# Patient Record
Sex: MALE | Race: BLACK OR AFRICAN AMERICAN | Employment: OTHER | URBAN - METROPOLITAN AREA
[De-identification: names, ages, dates, MRNs, and addresses within clinical notes are randomized per-mention and may not be internally consistent; named-entity substitution may affect disease eponyms.]

---

## 2017-01-13 ENCOUNTER — OFFICE VISIT (OUTPATIENT)
Dept: INTERNAL MEDICINE CLINIC | Age: 82
End: 2017-01-13

## 2017-01-13 VITALS
DIASTOLIC BLOOD PRESSURE: 57 MMHG | RESPIRATION RATE: 18 BRPM | SYSTOLIC BLOOD PRESSURE: 137 MMHG | WEIGHT: 146 LBS | BODY MASS INDEX: 22.91 KG/M2 | OXYGEN SATURATION: 100 % | HEART RATE: 43 BPM | TEMPERATURE: 97.8 F | HEIGHT: 67 IN

## 2017-01-13 DIAGNOSIS — I10 HYPERTENSION, ESSENTIAL, BENIGN: ICD-10-CM

## 2017-01-13 DIAGNOSIS — N40.1 BPH (BENIGN PROSTATIC HYPERTROPHY) WITH URINARY OBSTRUCTION: ICD-10-CM

## 2017-01-13 DIAGNOSIS — R35.0 URINE FREQUENCY: Primary | ICD-10-CM

## 2017-01-13 DIAGNOSIS — I50.22 SYSTOLIC CHF, CHRONIC (HCC): ICD-10-CM

## 2017-01-13 DIAGNOSIS — N13.8 BPH (BENIGN PROSTATIC HYPERTROPHY) WITH URINARY OBSTRUCTION: ICD-10-CM

## 2017-01-13 LAB
BILIRUB UR QL STRIP: NEGATIVE
GLUCOSE POC: 141 MG/DL
GLUCOSE UR-MCNC: NEGATIVE MG/DL
HBA1C MFR BLD HPLC: 5.2 %
KETONES P FAST UR STRIP-MCNC: NEGATIVE MG/DL
PH UR STRIP: 5 [PH] (ref 4.6–8)
PROT UR QL STRIP: NEGATIVE MG/DL
SP GR UR STRIP: 1025 (ref 1–1.03)
UA UROBILINOGEN AMB POC: ABNORMAL (ref 0.2–1)
URINALYSIS CLARITY POC: CLEAR
URINALYSIS COLOR POC: YELLOW
URINE BLOOD POC: NEGATIVE
URINE LEUKOCYTES POC: NEGATIVE
URINE NITRITES POC: NEGATIVE

## 2017-01-13 NOTE — PROGRESS NOTES
Reviewed record in preparation for visit and have obtained necessary documentation. Identified pt with two pt identifiers(name and ). Health Maintenance Due   Topic    DTaP/Tdap/Td series (1 - Tdap)    ZOSTER VACCINE AGE 60>     GLAUCOMA SCREENING Q2Y     MEDICARE YEARLY EXAM          Chief Complaint   Patient presents with    Urinary Frequency     medication not helping          Learning Assessment:  :     Learning Assessment 2/3/2014 2014   PRIMARY LEARNER Patient Patient   HIGHEST LEVEL OF EDUCATION - PRIMARY LEARNER  - GRADUATED HIGH SCHOOL OR GED   BARRIERS PRIMARY LEARNER - NONE   CO-LEARNER CAREGIVER - No   PRIMARY LANGUAGE ENGLISH ENGLISH   LEARNER PREFERENCE PRIMARY READING READING   ANSWERED BY patient patient    RELATIONSHIP SELF SELF       Depression Screening:  :     PHQ 2 / 9, over the last two weeks 2016   Little interest or pleasure in doing things Not at all   Feeling down, depressed or hopeless Not at all   Total Score PHQ 2 0       Fall Risk Assessment:  :     Fall Risk Assessment, last 12 mths 2016   Able to walk? Yes   Fall in past 12 months? No       Abuse Screening:  :     Abuse Screening Questionnaire 2015   Do you ever feel afraid of your partner? N N   Are you in a relationship with someone who physically or mentally threatens you? N N   Is it safe for you to go home? Y Y       Coordination of Care Questionnaire:  :     1) Have you been to an emergency room, urgent care clinic since your last visit? no   Hospitalized since your last visit? no             2) Have you seen or consulted any other health care providers outside of [address] [place] [address] since your last visit? no  (Include any pap smears or colon screenings in this section.)    3) Do you have an Advance Directive on file? yes    4) Are you interested in receiving information on Advance Directives?  NO      Patient is accompanied by patient I have received verbal consent from Ximena Barraza Ruiz to discuss any/all medical information while they are present in the room.

## 2017-01-13 NOTE — MR AVS SNAPSHOT
Visit Information Date & Time Provider Department Dept. Phone Encounter #  
 1/13/2017 10:00 AM Indiana JangAndrew [facility] Internal Medicine [phone] [id number] Follow-up Instructions Return in about 3 months (around 4/13/2017). Your Appointments 3/6/2017 10:45 AM  
ROUTINE CARE with Indiana Jang DO [facility] Internal Medicine ([facility]) Appt Note: 4 month follow up [address] [facility] [address]  
[address]  
  
    
 6/26/2017 10:00 AM  
ESTABLISHED PATIENT with Lila Cox MD  
[facility] ([facility]) Appt Note: June f/u [address] [place]  
[address] [address] Upcoming Health Maintenance Date Due DTaP/Tdap/Td series (1 - Tdap) 11/19/1956 ZOSTER VACCINE AGE 60> 11/19/1995 GLAUCOMA SCREENING Q2Y 11/19/2000 MEDICARE YEARLY EXAM 12/22/2016 Pneumococcal 65+ Low/Medium Risk (2 of 2 - PPSV23) 5/20/2017 Allergies as of 1/13/2017  Review Complete On: 1/13/2017 By: Indiana Jang DO Severity Noted Reaction Type Reactions Iodine High 02/22/2010   Intolerance Nausea and Vomiting Current Immunizations  Reviewed on 11/1/2016 Name Date Influenza High Dose Vaccine PF 9/6/2016, 9/29/2015 Influenza Vaccine 9/29/2015, 11/6/2013 Influenza Vaccine Split 10/12/2011 11:19 AM  
 Pneumococcal Conjugate (PCV-13) 5/20/2016 Not reviewed this visit You Were Diagnosed With   
  
 Codes Comments Urine frequency    -  Primary ICD-10-CM: R35.0 ICD-9-CM: 788.41   
 BPH (benign prostatic hypertrophy) with urinary obstruction     ICD-10-CM: N40.1, N13.8 ICD-9-CM: 600.01, 599.69 Systolic CHF, chronic (HCC)     ICD-10-CM: I50.22 ICD-9-CM: 428.22, 428.0  Hypertension, essential, benign     ICD-10-CM: I10 
 ICD-9-CM: 401.1 Vitals BP Pulse Temp Resp Height(growth percentile) Weight(growth percentile) 137/57 (BP 1 Location: Right arm, BP Patient Position: Sitting) (!) 43 97.8 °F (36.6 °C) (Oral) 18 5' 7\" (1.702 m) 146 lb (66.2 kg) SpO2 BMI Smoking Status 100% 22.87 kg/m2 Never Smoker BMI and BSA Data Body Mass Index Body Surface Area  
 22.87 kg/m 2 1.77 m 2 Preferred Pharmacy Pharmacy Name Phone [address] AT [address] [place] [address]. [phone] Your Updated Medication List  
  
   
This list is accurate as of: 1/13/17 11:17 AM.  Always use your most recent med list.  
  
  
  
  
 aspirin 81 mg chewable tablet Take 1 Tab by mouth daily. atorvastatin 10 mg tablet Commonly known as:  LIPITOR  
TAKE 1 TABLET BY MOUTH EVERY NIGHT AT BEDTIME  
  
 diclofenac 1 % Gel Commonly known as:  VOLTAREN Apply 2 g to affected area four (4) times daily. finasteride 5 mg tablet Commonly known as:  PROSCAR Take 1 Tab by mouth daily. losartan 50 mg tablet Commonly known as:  COZAAR Take 2 Tabs by mouth daily. We Performed the Following AMB POC HEMOGLOBIN A1C [67414 CPT(R)] AMB POC URINALYSIS DIP STICK AUTO W/O MICRO [09363 CPT(R)] AMB POC URINALYSIS DIP STICK AUTO W/O MICRO [86500 CPT(R)] Follow-up Instructions Return in about 3 months (around 4/13/2017). Introducing [facility]! Jame Odell introduces [employer] patient portal. Now you can access parts of your medical record, email your doctor's office, and request medication refills online. 1. In your internet browser, go to [email or web address] 2. Click on the First Time User? Click Here link in the Sign In box. You will see the New Member Sign Up page. 3. Enter your [employer] Access Code exactly as it appears below.  You will not need to use this code after youve completed the sign-up process. If you do not sign up before the expiration date, you must request a new code. · [employer] Access Code: [email or web address] Expires: 1/30/2017 11:14 AM 
 
4. Enter the last four digits of your Social Security Number (xxxx) and Date of Birth (mm/dd/yyyy) as indicated and click Submit. You will be taken to the next sign-up page. 5. Create a [employer] ID. This will be your [employer] login ID and cannot be changed, so think of one that is secure and easy to remember. 6. Create a [employer] password. You can change your password at any time. 7. Enter your Password Reset Question and Answer. This can be used at a later time if you forget your password. 8. Enter your e-mail address. You will receive e-mail notification when new information is available in [address] 9. Click Sign Up. You can now view and download portions of your medical record. 10. Click the Download Summary menu link to download a portable copy of your medical information. If you have questions, please visit the Frequently Asked Questions section of the [employer] website. Remember, [employer] is NOT to be used for urgent needs. For medical emergencies, dial 911. Now available from your iPhone and Android! Please provide this summary of care documentation to your next provider. Your primary care clinician is listed as Florentin Marshall. If you have any questions after today's visit, please call [phone].

## 2017-01-17 ENCOUNTER — OFFICE VISIT (OUTPATIENT)
Dept: INTERNAL MEDICINE CLINIC | Age: 82
End: 2017-01-17

## 2017-01-17 VITALS
HEART RATE: 48 BPM | RESPIRATION RATE: 17 BRPM | BODY MASS INDEX: 23.07 KG/M2 | OXYGEN SATURATION: 100 % | DIASTOLIC BLOOD PRESSURE: 63 MMHG | SYSTOLIC BLOOD PRESSURE: 162 MMHG | WEIGHT: 147 LBS | HEIGHT: 67 IN | TEMPERATURE: 97 F

## 2017-01-17 DIAGNOSIS — R10.9 LEFT SIDED ABDOMINAL PAIN: Primary | ICD-10-CM

## 2017-01-17 DIAGNOSIS — R39.15 URINARY URGENCY: ICD-10-CM

## 2017-01-17 DIAGNOSIS — R35.0 URINE FREQUENCY: ICD-10-CM

## 2017-01-17 DIAGNOSIS — I50.22 SYSTOLIC CHF, CHRONIC (HCC): ICD-10-CM

## 2017-01-17 DIAGNOSIS — I10 HYPERTENSION, ESSENTIAL, BENIGN: ICD-10-CM

## 2017-01-17 LAB
BILIRUB UR QL STRIP: NEGATIVE
GLUCOSE UR-MCNC: NEGATIVE MG/DL
KETONES P FAST UR STRIP-MCNC: NEGATIVE MG/DL
PH UR STRIP: 5.5 [PH] (ref 4.6–8)
PROT UR QL STRIP: NEGATIVE MG/DL
SP GR UR STRIP: 1025 (ref 1–1.03)
UA UROBILINOGEN AMB POC: ABNORMAL (ref 0.2–1)
URINALYSIS CLARITY POC: CLEAR
URINALYSIS COLOR POC: YELLOW
URINE BLOOD POC: NEGATIVE
URINE LEUKOCYTES POC: NEGATIVE
URINE NITRITES POC: NEGATIVE

## 2017-01-17 NOTE — PROGRESS NOTES
Reviewed record in preparation for visit and have obtained necessary documentation. Identified pt with two pt identifiers(name and ). Health Maintenance Due   Topic    DTaP/Tdap/Td series (1 - Tdap)    ZOSTER VACCINE AGE 60>     GLAUCOMA SCREENING Q2Y     MEDICARE YEARLY EXAM          Chief Complaint   Patient presents with    Urinary Frequency          Learning Assessment:  :     Learning Assessment 2/3/2014 2014   PRIMARY LEARNER Patient Patient   HIGHEST LEVEL OF EDUCATION - PRIMARY LEARNER  - GRADUATED HIGH SCHOOL OR GED   BARRIERS PRIMARY LEARNER - NONE   CO-LEARNER CAREGIVER - No   PRIMARY LANGUAGE ENGLISH ENGLISH   LEARNER PREFERENCE PRIMARY READING READING   ANSWERED BY patient patient    RELATIONSHIP SELF SELF       Depression Screening:  :     PHQ 2 / 9, over the last two weeks 2016   Little interest or pleasure in doing things Not at all   Feeling down, depressed or hopeless Not at all   Total Score PHQ 2 0       Fall Risk Assessment:  :     Fall Risk Assessment, last 12 mths 2016   Able to walk? Yes   Fall in past 12 months? No       Abuse Screening:  :     Abuse Screening Questionnaire 2015   Do you ever feel afraid of your partner? N N   Are you in a relationship with someone who physically or mentally threatens you? N N   Is it safe for you to go home? Y Y       Coordination of Care Questionnaire:  :     1) Have you been to an emergency room, urgent care clinic since your last visit? no   Hospitalized since your last visit? no             2) Have you seen or consulted any other health care providers outside of [address] [place] [address] since your last visit? no  (Include any pap smears or colon screenings in this section.)    3) Do you have an Advance Directive on file? yes    4) Are you interested in receiving information on Advance Directives?  NO      Patient is accompanied by patient I have received verbal consent from Maria Fernanda De Souza to discuss any/all medical information while they are present in the room.

## 2017-01-17 NOTE — MR AVS SNAPSHOT
Visit Information Date & Time Provider Department Dept. Phone Encounter #  
 1/17/2017  9:30 AM Tamika Martinez, [address] [facility] Internal Medicine [phone] [id number] Follow-up Instructions Return if symptoms worsen or fail to improve. Your Appointments 3/6/2017 10:45 AM  
ROUTINE CARE with Tamika Martinez DO [facility] Internal Medicine ([address] [place] [address]) Appt Note: 4 month follow up [address] [facility] [address]  
[phone]  
  
   
 [address] [address]  
  
    
 4/14/2017  9:45 AM  
ROUTINE CARE with Tamika Martinez DO [facility] Internal Medicine ([address] [place] [address]) Appt Note: 3 month f/u  
 [address] [facility] [address]  
[phone]  
  
    
 6/26/2017 10:00 AM  
ESTABLISHED PATIENT with Heidy Myers MD  
[facility] ([facility]) Appt Note: June f/u  
 [address] [place] [address]  
[address] [address] [facility] [address] [address] Upcoming Health Maintenance Date Due DTaP/Tdap/Td series (1 - Tdap) [birth year]19/1956 ZOSTER VACCINE AGE 60> 11/19/1995 GLAUCOMA SCREENING Q2Y 11/19/2000 MEDICARE YEARLY EXAM 12/22/2016 Pneumococcal 65+ Low/Medium Risk (2 of 2 - PPSV23) 5/20/2017 Allergies as of 1/17/2017  Review Complete On: 1/17/2017 By: Tamika Martinez DO Severity Noted Reaction Type Reactions Iodine High 02/22/2010   Intolerance Nausea and Vomiting Current Immunizations  Reviewed on 11/1/2016 Name Date Influenza High Dose Vaccine PF 9/6/2016, 9/29/2015 Influenza Vaccine 9/29/2015, 11/6/2013 Influenza Vaccine Split 10/12/2011 11:19 AM  
 Pneumococcal Conjugate (PCV-13) 5/20/2016 Not reviewed this visit You Were Diagnosed With   
  
 Codes Comments Left sided abdominal pain    -  Primary ICD-10-CM: R10.9 ICD-9-CM: 789.09 Urine frequency     ICD-10-CM: R35.0 ICD-9-CM: 788.41 Urinary urgency     ICD-10-CM: R39.15 ICD-9-CM: 399.42 Systolic CHF, chronic (HCC)     ICD-10-CM: I50.22 ICD-9-CM: 428.22, 428.0 Hypertension, essential, benign     ICD-10-CM: I10 
ICD-9-CM: 401.1 Vitals BP Pulse Temp Resp Height(growth percentile) Weight(growth percentile) 162/63 (BP 1 Location: Right arm, BP Patient Position: Sitting) (!) 48 97 °F (36.1 °C) (Oral) 17 5' 7\" (1.702 m) 147 lb (66.7 kg) SpO2 BMI Smoking Status 100% 23.02 kg/m2 Never Smoker BMI and BSA Data Body Mass Index Body Surface Area 23.02 kg/m 2 1.78 m 2 Preferred Pharmacy Pharmacy Name Phone [address] [facility] AT [address] [place] [address]. [phone] Your Updated Medication List  
  
   
This list is accurate as of: 1/17/17 10:35 AM.  Always use your most recent med list.  
  
  
  
  
 aspirin 81 mg chewable tablet Take 1 Tab by mouth daily. atorvastatin 10 mg tablet Commonly known as:  LIPITOR  
TAKE 1 TABLET BY MOUTH EVERY NIGHT AT BEDTIME  
  
 diclofenac 1 % Gel Commonly known as:  VOLTAREN Apply 2 g to affected area four (4) times daily. finasteride 5 mg tablet Commonly known as:  PROSCAR Take 1 Tab by mouth daily. losartan 50 mg tablet Commonly known as:  COZAAR Take 2 Tabs by mouth daily. We Performed the Following AMB POC URINALYSIS DIP STICK AUTO W/O MICRO [96489 CPT(R)] Follow-up Instructions Return if symptoms worsen or fail to improve. To-Do List   
 01/17/2017 Imaging:  CT ABD W CONT   
  
 01/17/2017 Imaging:  CT PELV WO CONT Referral Information Referral ID Referred By Referred To  
  
 [id number] Emily Cespedes Not Available Visits Status Start Date End Date 1 New Request 1/17/17 1/17/18  If your referral has a status of pending review or denied, additional information will be sent to support the outcome of this decision. Referral ID Referred By Referred To  
 [id number] Hiral Rocha Not Available Visits Status Start Date End Date 1 New Request 1/17/17 1/17/18 If your referral has a status of pending review or denied, additional information will be sent to support the outcome of this decision. Introducing [facility] & HE[facility]! Leonard Cerda introduces [employer] patient portal. Now you can access parts of your medical record, email your doctor's office, and request medication refills online. 1. In your internet browser, go to [email or web address] 2. Click on the First Time User? Click Here link in the Sign In box. You will see the New Member Sign Up page. 3. Enter your [employer] Access Code exactly as it appears below. You will not need to use this code after youve completed the sign-up process. If you do not sign up before the expiration date, you must request a new code. · [employer] Access Code: [email or web address] Expires: 1/30/2017 11:14 AM 
 
4. Enter the last four digits of your Social Security Number (xxxx) and Date of Birth (mm/dd/yyyy) as indicated and click Submit. You will be taken to the next sign-up page. 5. Create a [employer] ID. This will be your [employer] login ID and cannot be changed, so think of one that is secure and easy to remember. 6. Create a [employer] password. You can change your password at any time. 7. Enter your Password Reset Question and Answer. This can be used at a later time if you forget your password. 8. Enter your e-mail address. You will receive e-mail notification when new information is available in [address] 9. Click Sign Up. You can now view and download portions of your medical record. 10. Click the Download Summary menu link to download a portable copy of your medical information.  
 
If you have questions, please visit the Frequently Asked Questions section of the [employer]. Remember, [employer] is NOT to be used for urgent needs. For medical emergencies, dial 911. Now available from your iPhone and Android! Please provide this summary of care documentation to your next provider. Your primary care clinician is listed as [address]. If you have any questions after today's visit, please call [phone].

## 2017-01-30 NOTE — PROGRESS NOTES
HISTORY OF PRESENT ILLNESS  Erika Shah is a 80 y.o. male. Pt. comes in for f/u. Has multiple medical problems. Reports having urine frequency during day. No pain or blood. No other GI or  issues. Has some nocturia too. On Flomax and Proscar for BPH. Followed by cardiology for CHF. Last EF was 25 to 30 %. Reports compliance with medications and diet. Med list and most recent labs/studies reviewed with pt. All look stable. Trying to be active physically as tolerated. Reports no other new c/o. Urinary Frequency    Associated symptoms include frequency and urgency. Pertinent negatives include no hematuria, no flank pain, no abdominal pain and no back pain. Hypertension    Associated symptoms include chest pain (muscular,chronic) and shortness of breath (PATEL). Pertinent negatives include no palpitations, no blurred vision, no headaches, no neck pain and no dizziness. CHF   Associated symptoms include chest pain (muscular,chronic) and shortness of breath (PATEL). Pertinent negatives include no abdominal pain and no headaches. Follow Up Chronic Condition   Associated symptoms include chest pain (muscular,chronic) and shortness of breath (PATEL). Pertinent negatives include no abdominal pain and no headaches. Review of Systems   Constitutional: Negative. HENT: Negative. Negative for congestion. Eyes: Negative for blurred vision. Respiratory: Positive for shortness of breath (PATEL). Negative for cough, hemoptysis, sputum production and wheezing. Cardiovascular: Positive for chest pain (muscular,chronic). Negative for palpitations and leg swelling. Gastrointestinal: Negative for abdominal pain. Genitourinary: Positive for frequency and urgency. Negative for dysuria, flank pain and hematuria. Musculoskeletal: Positive for joint pain. Negative for back pain and neck pain. Skin: Negative. Neurological: Negative for dizziness, sensory change, focal weakness and headaches. Endo/Heme/Allergies: Negative for polydipsia. Psychiatric/Behavioral: Negative for depression. Physical Exam   Constitutional: He is oriented to person, place, and time. He appears well-developed and well-nourished. No distress. HENT:   Head: Normocephalic and atraumatic. Mouth/Throat: Oropharynx is clear and moist.   Lower lip small ulcer   Eyes: Conjunctivae are normal. No scleral icterus. Neck: Normal range of motion. Neck supple. No JVD present. No thyromegaly present. Cardiovascular: Normal rate and regular rhythm. Murmur (2/6 ARNAUD, chronic) heard. Pulmonary/Chest: Effort normal and breath sounds normal. No respiratory distress. He has no wheezes. He has no rales. He exhibits tenderness (left upper side, chronic). Abdominal: Soft. Bowel sounds are normal. He exhibits no distension. There is no tenderness. Musculoskeletal: Normal range of motion. He exhibits no edema or tenderness. Neurological: He is alert and oriented to person, place, and time. Coordination normal.   Skin: Skin is warm and dry. No rash noted. Psychiatric: He has a normal mood and affect. His behavior is normal.   Nursing note and vitals reviewed. ASSESSMENT and PLAN  Cathy Roberts was seen today for urinary frequency, hypertension, chf and follow up chronic condition. Diagnoses and all orders for this visit:    Urine frequency  -     AMB POC URINALYSIS DIP STICK AUTO W/O MICRO ---> WNL  -     Cancel: AMB POC URINALYSIS DIP STICK AUTO W/O MICRO  -     AMB POC HEMOGLOBIN A1C ----> 5.2  -     AMB POC GLUCOSE BLOOD, BY GLUCOSE MONITORING DEVICE ---> 142    BPH (benign prostatic hypertrophy) with urinary obstruction    Systolic CHF, chronic (HCC)    Hypertension, essential, benign      Follow-up Disposition:  Return in about 3 months (around 4/13/2017).    lab results and schedule of future lab studies reviewed with patient  reviewed diet, exercise and weight control  reviewed medications and side effects in detail  Reassurance  No evidence of infection or DM

## 2017-01-31 NOTE — PROGRESS NOTES
HISTORY OF PRESENT ILLNESS  Christel Jenkins is a 80 y.o. male. Pt. comes in for f/u. Has multiple medical problems. Reports continued urine frequency. Has some nocturia too. Having L sided abd/flank pain now. No blood. No other GI or  issues. UA and A1c on 1/13 were normal. I stopped Flomax but no changes. Remains on Proscar for BPH. Followed by cardiology for CHF. Last EF was 25 to 30 %. Reports compliance with medications and diet. Med list and most recent labs/studies reviewed with pt. All look stable. Trying to be active physically as tolerated. Reports no other new c/o. Urinary Frequency    Associated symptoms include frequency, urgency, abdominal pain and back pain. Pertinent negatives include no nausea, no vomiting, no hematuria and no flank pain. Side Pain   Associated symptoms include chest pain (muscular,chronic), abdominal pain and shortness of breath (PATEL). Pertinent negatives include no headaches. Review of Systems   Constitutional: Negative. HENT: Negative. Negative for congestion. Eyes: Negative for blurred vision. Respiratory: Positive for shortness of breath (PATEL). Negative for cough, hemoptysis, sputum production and wheezing. Cardiovascular: Positive for chest pain (muscular,chronic). Negative for palpitations and leg swelling. Gastrointestinal: Positive for abdominal pain. Negative for blood in stool, constipation, diarrhea, heartburn, melena, nausea and vomiting. Genitourinary: Positive for frequency and urgency. Negative for dysuria, flank pain and hematuria. Musculoskeletal: Positive for back pain and joint pain. Negative for falls and neck pain. Skin: Negative. Neurological: Negative for dizziness, sensory change, focal weakness and headaches. Endo/Heme/Allergies: Negative for polydipsia. Psychiatric/Behavioral: Negative for depression. Physical Exam   Constitutional: He is oriented to person, place, and time.  He appears well-developed and well-nourished. No distress. HENT:   Head: Normocephalic and atraumatic. Mouth/Throat: Oropharynx is clear and moist.   Lower lip small ulcer   Eyes: Conjunctivae are normal. No scleral icterus. Neck: Normal range of motion. Neck supple. No JVD present. No thyromegaly present. Cardiovascular: Normal rate and regular rhythm. Murmur (2/6 ARNAUD, chronic) heard. Pulmonary/Chest: Effort normal and breath sounds normal. No respiratory distress. He has no wheezes. He has no rales. He exhibits tenderness (left upper side, chronic). Abdominal: Soft. Bowel sounds are normal. He exhibits no distension and no mass. There is tenderness (L lower side). There is no rebound and no guarding. Musculoskeletal: Normal range of motion. He exhibits no edema or tenderness. Neurological: He is alert and oriented to person, place, and time. Coordination normal.   Skin: Skin is warm and dry. No rash noted. Psychiatric: He has a normal mood and affect. His behavior is normal.   Nursing note and vitals reviewed. ASSESSMENT and PLAN  Jodi Rowe was seen today for urinary frequency and side pain.     Diagnoses and all orders for this visit:    Left sided abdominal pain  -     AMB POC URINALYSIS DIP STICK AUTO W/O MICRO  -     CT ABD W CONT; Future  -     CT PELV WO CONT; Future    Urine frequency  -     AMB POC URINALYSIS DIP STICK AUTO W/O MICRO  ---> negative again  -     CT ABD W CONT; Future  -     CT PELV WO CONT; Future    Urinary urgency  -     AMB POC URINALYSIS DIP STICK AUTO W/O MICRO  -     CT ABD W CONT; Future  -     CT PELV WO CONT; Future    Systolic CHF, chronic (HCC)    Hypertension, essential, benign      Follow-up Disposition:  Return if symptoms worsen or fail to improve.   lab results and schedule of future lab studies reviewed with patient  reviewed diet, exercise and weight control  reviewed medications and side effects in detail  Tylenol prn pain  May need  referral depending on what CT shows

## 2017-02-08 ENCOUNTER — HOSPITAL ENCOUNTER (EMERGENCY)
Age: 82
Discharge: HOME | End: 2017-02-08
Attending: EMERGENCY MEDICINE
Payer: MEDICARE

## 2017-02-08 VITALS
HEART RATE: 57 BPM | BODY MASS INDEX: 25.11 KG/M2 | OXYGEN SATURATION: 100 % | WEIGHT: 160 LBS | DIASTOLIC BLOOD PRESSURE: 78 MMHG | RESPIRATION RATE: 16 BRPM | SYSTOLIC BLOOD PRESSURE: 145 MMHG | TEMPERATURE: 98 F | HEIGHT: 67 IN

## 2017-02-08 DIAGNOSIS — R35.0 URINARY FREQUENCY: Primary | ICD-10-CM

## 2017-02-08 LAB
ANION GAP BLD CALC-SCNC: 17 MMOL/L (ref 5–15)
APPEARANCE UR: CLEAR
BACTERIA URNS QL MICRO: NEGATIVE /HPF
BILIRUB UR QL: NEGATIVE
BUN BLD-MCNC: 17 MG/DL (ref 9–20)
CA-I BLD-MCNC: 1.21 MMOL/L (ref 1.12–1.32)
CHLORIDE BLD-SCNC: 108 MMOL/L (ref 98–107)
CO2 BLD-SCNC: 23 MMOL/L (ref 21–32)
COLOR UR: NORMAL
CREAT BLD-MCNC: 1.1 MG/DL (ref 0.6–1.3)
EPITH CASTS URNS QL MICRO: NORMAL /LPF
GLUCOSE BLD-MCNC: 89 MG/DL (ref 75–110)
GLUCOSE UR STRIP.AUTO-MCNC: NEGATIVE MG/DL
HCT VFR BLD CALC: 40 % (ref 36.6–50.3)
HGB BLD-MCNC: 13.6 GM/DL (ref 12.1–17)
HGB UR QL STRIP: NEGATIVE
KETONES UR QL STRIP.AUTO: NEGATIVE MG/DL
LEUKOCYTE ESTERASE UR QL STRIP.AUTO: NEGATIVE
NITRITE UR QL STRIP.AUTO: NEGATIVE
PH UR STRIP: 5.5 [PH] (ref 5–8)
POTASSIUM BLD-SCNC: 4.3 MMOL/L (ref 3.5–5.1)
PROT UR STRIP-MCNC: NEGATIVE MG/DL
RBC #/AREA URNS HPF: NORMAL /HPF (ref 0–5)
SERVICE CMNT-IMP: ABNORMAL
SODIUM BLD-SCNC: 143 MMOL/L (ref 136–145)
SP GR UR REFRACTOMETRY: 1.02 (ref 1–1.03)
UA: UC IF INDICATED,UAUC: NORMAL
UROBILINOGEN UR QL STRIP.AUTO: 1 EU/DL (ref 0.2–1)
WBC URNS QL MICRO: NORMAL /HPF (ref 0–4)

## 2017-02-08 PROCEDURE — 81001 URINALYSIS AUTO W/SCOPE: CPT | Performed by: EMERGENCY MEDICINE

## 2017-02-08 PROCEDURE — 80047 BASIC METABLC PNL IONIZED CA: CPT

## 2017-02-08 PROCEDURE — 51798 US URINE CAPACITY MEASURE: CPT

## 2017-02-08 PROCEDURE — 99283 EMERGENCY DEPT VISIT LOW MDM: CPT

## 2017-02-08 NOTE — DISCHARGE INSTRUCTIONS
Frequent Urination: Care Instructions  Your Care Instructions  An urge to urinate frequently but usually passing only small amounts of urine is a common symptom of urinary problems, such as urinary tract infections. The bladder may become inflamed. This can cause the urge to urinate. You may try to urinate more often than usual to try to soothe that urge. Frequent urination also may be caused by sexually transmitted infections (STIs) or kidney stones. Or it may happen when something irritates the tube that carries urine from the bladder to the outside of the body (urethra). It may also be a sign of diabetes. The cause may be hard to find. You may need tests. Follow-up care is a key part of your treatment and safety. Be sure to make and go to all appointments, and call your doctor if you are having problems. It's also a good idea to know your test results and keep a list of the medicines you take. How can you care for yourself at home? · Drink extra water and juices such as cranberry and blueberry juices for the next day or two. This will help make the urine less concentrated. And it may help wash out any bacteria that may be causing an infection. (If you have kidney, heart, or liver disease and have to limit fluids, talk with your doctor before you increase the amount of fluids you drink.)  · Avoid drinks that are carbonated or have caffeine. They can irritate the bladder. For women:  · Urinate right after you have sex. · After you go to the bathroom, wipe from front to back. · Avoid douches, bubble baths, and feminine hygiene sprays. And avoid other feminine hygiene products that have deodorants. When should you call for help? Call your doctor now or seek immediate medical care if:  · You have new symptoms, such as fever, nausea, or vomiting. · You have new or worse symptoms of a urinary problem. For example:  ¨ You have blood or pus in your urine. ¨ You have chills or body aches.   ¨ It hurts to urinate. ¨ You have groin or belly pain. ¨ You have pain in your back just below your rib cage (the flank area). Watch closely for changes in your health, and be sure to contact your doctor if you feel thirstier than usual.  Where can you learn more? Go to http://ross-cristina.info/. Enter 308 [id number] in the search box to learn more about \"Frequent Urination: Care Instructions. \"  Current as of: August 12, 2016  Content Version: 11.1  © 6538-2169 [employer]. Care instructions adapted under license by [employer] (which disclaims liability or warranty for this information). If you have questions about a medical condition or this instruction, always ask your healthcare professional. [address] any warranty or liability for your use of this information. We hope that we have addressed all of your medical concerns. The examination and treatment you received in the Emergency Department were for an emergent problem and were not intended as complete care. It is important that you follow up with your healthcare provider(s) for ongoing care. If your symptoms worsen or do not improve as expected, and you are unable to reach your usual health care provider(s), you should return to the Emergency Department. Today's healthcare is undergoing tremendous change, and patient satisfaction surveys are one of the many tools to assess the quality of medical care. You may receive a survey from the [employer] organization regarding your experience in the Emergency Department. I hope that your experience has been completely positive, particularly the medical care that I provided. As such, please participate in the survey; anything less than excellent does not meet my expectations or intentions. [address] [facility] and [address] [place] [address] participate in nationally recognized quality of care measures.   If your blood pressure is greater than 120/80, as reported below, we urge that you seek medical care to address the potential of high blood pressure, commonly known as hypertension. Hypertension can be hereditary or can be caused by certain medical conditions, pain, stress, or \"white coat syndrome. \"       Please make an appointment with your health care provider(s) for follow up of your Emergency Department visit. VITALS:   Patient Vitals for the past 8 hrs:   Temp Pulse Resp BP SpO2   02/08/17 1701 98 °F (36.7 °C) (!) 57 16 145/78 100 %   02/08/17 1337 97.7 °F (36.5 °C) (!) 42 16 151/73 100 %          Thank you for allowing us to provide you with medical care today. We realize that you have many choices for your emergency care needs. Please choose us in the future for any continued health care needs. [employer]  Mayuri Heading, [address] [place] [address] 20.   Office: [phone]            Recent Results (from the past 24 hour(s))   URINALYSIS W/ REFLEX CULTURE    Collection Time: 02/08/17  2:54 PM   Result Value Ref Range    Color YELLOW/STRAW      Appearance CLEAR CLEAR      Specific gravity 1.018 1.003 - 1.030      pH (UA) 5.5 5.0 - 8.0      Protein NEGATIVE  NEG mg/dL    Glucose NEGATIVE  NEG mg/dL    Ketone NEGATIVE  NEG mg/dL    Bilirubin NEGATIVE  NEG      Blood NEGATIVE  NEG      Urobilinogen 1.0 0.2 - 1.0 EU/dL    Nitrites NEGATIVE  NEG      Leukocyte Esterase NEGATIVE  NEG      WBC 0-4 0 - 4 /hpf    RBC 0-5 0 - 5 /hpf    Epithelial cells FEW FEW /lpf    Bacteria NEGATIVE  NEG /hpf    UA:UC IF INDICATED CULTURE NOT INDICATED BY UA RESULT CNI     POC CHEM8    Collection Time: 02/08/17  4:52 PM   Result Value Ref Range    Calcium, ionized (POC) 1.21 1.12 - 1.32 MMOL/L    Sodium (POC) 143 136 - 145 MMOL/L    Potassium (POC) 4.3 3.5 - 5.1 MMOL/L    Chloride (POC) 108 (H) 98 - 107 MMOL/L    CO2 (POC) 23 21 - 32 MMOL/L    Anion gap (POC) 17 (H) 5 - 15 mmol/L    Glucose (POC) 89 75 - 110 MG/DL    BUN (POC) 17 9 - 20 MG/DL    Creatinine (POC) 1.1 0.6 - 1.3 MG/DL    GFR-AA (POC) >60 >60 ml/min/1.73m2    GFR, non-AA (POC) >60 >60 ml/min/1.73m2    Hemoglobin (POC) 13.6 12.1 - 17.0 GM/DL    Hematocrit (POC) 40 36.6 - 50.3 %    Comment Notified RN or MD immediately by          No results found.

## 2017-02-08 NOTE — ED PROVIDER NOTES
HPI Comments: 80 y.o. male with past medical history significant for thrombocytopaenia, hypercholesterolemia, BPH, heart murmur, alopecia, ED, aortic insufficiency, GERD, arthritis, HTN, NSTEMI, and is s/p appendectomy who presents from home via private vehicle with chief complaint of urinary frequency. Pt reports that he has been urinating \"around the clock\", citing frequency during the day and at night. Pt also notes intermittently having the urge to urinate but being unable to do so. Pt additionally reports that he has been having some sternal discomfort when he presses his chest or takes deep breaths. Chest discomfort is nonexertional. He states that he has been seen by his cardiologist for this complaint and that physician is not concerned. Pt denies fever, chills, cough, wheeze, and any recent problems with GERD sx. There are no other acute medical concerns at this time. Social hx: Never smoker. No alcohol use. PCP: Addy Mcgee,     History may be limited as the pt is a poor historian. He is unable to remember the names of his urologist and cardiologist, and was unable to state any dx he has for urinary complaints and sternal chest discomfort. Old Chart Review: pt was seen by PCP for urinary frequency on 1/13/17. Per Dr. Blossom Sanford office note, pt is on Flomax and Proscar for BPH. He is followed by cardiology for CHF and his last EF was 25 to 30 %. Note written by Faith Worrell, as dictated by Mireya Brown MD 3:28 PM    The history is provided by the patient.         Past Medical History:   Diagnosis Date    Alopecia     Aortic insufficiency     Arthritis     BPH     ED (erectile dysfunction)     Essential hypertension     GERD (gastroesophageal reflux disease)     Heart murmur     Hypercholesterolemia     Hypertension      states takes no medications    NSTEMI (non-ST elevated myocardial infarction) ([facility] [address] 7/18/2016    Thrombocytopaenia      PT UNAWARE OF THIS DIAGNOSIS, CAN'T GIVE DETAILS 1/25/12       Past Surgical History:   Procedure Laterality Date    Hx appendectomy      Hx gi       COLONOSCOPY    Pr total knee arthroplasty  1993, 2011     LEFT TOTAL KNEE    Pr total knee arthroplasty  2008     RIGHT TOTAL KNEE         Family History:   Problem Relation Age of Onset    Heart Disease Mother     Cancer Maternal Grandmother      Colon cancer diagnosed at age 80.  Asthma Sister        Social History     Social History    Marital status:      Spouse name: N/A    Number of children: N/A    Years of education: N/A     Occupational History    Not on file. Social History Main Topics    Smoking status: Never Smoker    Smokeless tobacco: Never Used    Alcohol use No    Drug use: No    Sexual activity: Not on file     Other Topics Concern    Not on file     Social History Narrative         ALLERGIES: Iodine    Review of Systems   Constitutional: Negative for appetite change, chills and fever. HENT: Negative for congestion. Eyes: Negative for visual disturbance. Respiratory: Negative for cough, chest tightness, shortness of breath and wheezing. Cardiovascular: Positive for chest pain. Gastrointestinal: Negative for abdominal pain, diarrhea and vomiting. Genitourinary: Positive for difficulty urinating and frequency. Negative for dysuria. Musculoskeletal: Negative for joint swelling. Skin: Negative for rash. Neurological: Negative for speech difficulty and headaches. All other systems reviewed and are negative. Vitals:    02/08/17 1337   BP: 151/73   Pulse: (!) 42   Resp: 16   Temp: 97.7 °F (36.5 °C)   SpO2: 100%   Weight: 72.6 kg (160 lb)   Height: 5' 7\" (1.702 m)            Physical Exam   Constitutional: He is oriented to person, place, and time. He appears well-developed and well-nourished. No distress. HENT:   Head: Normocephalic and atraumatic.    Nose: Nose normal.   Eyes: Conjunctivae are normal. Pupils are equal, round, and reactive to light. No scleral icterus. Neck: Normal range of motion. Neck supple. No JVD present. No tracheal deviation present. No thyromegaly present. Cardiovascular: Normal rate, regular rhythm and normal heart sounds. No murmur heard. Pulmonary/Chest: Effort normal and breath sounds normal. No respiratory distress. He has no wheezes. He has no rales. Mid sternal discomfort reproducible with palpation. Abdominal: Soft. Bowel sounds are normal. He exhibits no mass. There is no tenderness. There is no rebound and no guarding. Musculoskeletal: Normal range of motion. He exhibits no edema. Neurological: He is alert and oriented to person, place, and time. No cranial nerve deficit. Coordination normal.   Skin: Skin is warm and dry. No rash noted. He is not diaphoretic. No erythema. Psychiatric: He has a normal mood and affect. His behavior is normal.   Nursing note and vitals reviewed.      Note written by Faith Valdivia, as dictated by Dannie Nguyen MD 3:28 PM    [facility]  ED Course       Procedures

## 2017-02-08 NOTE — ED TRIAGE NOTES
Pt states that he has middle abd pain for the past week with no N/V but having some diarrhea after he eats at times. Pt states that he has been urinating frequently with no pain for the past week.

## 2017-02-20 ENCOUNTER — OFFICE VISIT (OUTPATIENT)
Dept: INTERNAL MEDICINE CLINIC | Age: 82
End: 2017-02-20

## 2017-02-20 VITALS
TEMPERATURE: 96.8 F | RESPIRATION RATE: 17 BRPM | DIASTOLIC BLOOD PRESSURE: 59 MMHG | HEIGHT: 67 IN | SYSTOLIC BLOOD PRESSURE: 74 MMHG | OXYGEN SATURATION: 99 % | WEIGHT: 146 LBS | BODY MASS INDEX: 22.91 KG/M2 | HEART RATE: 61 BPM

## 2017-02-20 DIAGNOSIS — N13.8 BPH (BENIGN PROSTATIC HYPERTROPHY) WITH URINARY OBSTRUCTION: ICD-10-CM

## 2017-02-20 DIAGNOSIS — R63.4 WEIGHT LOSS: ICD-10-CM

## 2017-02-20 DIAGNOSIS — R10.30 LOWER ABDOMINAL PAIN: Primary | ICD-10-CM

## 2017-02-20 DIAGNOSIS — Z88.8 ALLERGY TO IODINE: ICD-10-CM

## 2017-02-20 DIAGNOSIS — R01.1 SYSTOLIC MURMUR: ICD-10-CM

## 2017-02-20 DIAGNOSIS — I50.22 SYSTOLIC CHF, CHRONIC (HCC): ICD-10-CM

## 2017-02-20 DIAGNOSIS — N40.1 BPH (BENIGN PROSTATIC HYPERTROPHY) WITH URINARY OBSTRUCTION: ICD-10-CM

## 2017-02-20 RX ORDER — PREDNISONE 50 MG/1
TABLET ORAL
Qty: 3 TAB | Refills: 0 | Status: SHIPPED | OUTPATIENT
Start: 2017-02-20 | End: 2017-04-14 | Stop reason: ALTCHOICE

## 2017-02-20 RX ORDER — TAMSULOSIN HYDROCHLORIDE 0.4 MG/1
CAPSULE ORAL
Refills: 3 | COMMUNITY
Start: 2017-02-09

## 2017-02-20 RX ORDER — DIPHENHYDRAMINE HCL 25 MG
TABLET ORAL
Qty: 2 TAB | Refills: 0 | Status: SHIPPED | OUTPATIENT
Start: 2017-02-20 | End: 2017-04-14 | Stop reason: ALTCHOICE

## 2017-02-20 NOTE — PROGRESS NOTES
HISTORY OF PRESENT ILLNESS  Jaimie Alejandra is a 80 y.o. male. Pt. comes in with a friend  for f/u. Has multiple medical problems. Has been losing weight. Having continued lower abd pain mostly suprapubic. Reports continued urine frequency and nocturia. No blood or dysuria. No other GI or  issues. Recent UA and A1c were normal. I stopped Flomax but no changes. Remains on Proscar for BPH. Saw Urologist and put back on Flomax. Not happy since urologist did not spend much time or explain to him what was wrong. Hospitalized in 7/16 for abd pain. I reviewed records. Abd/Pelvic CT scan showed non-specific changes as documented in CC. Followed by cardiology for CHF. Last EF was 25 to 30 %. Reports compliance with medications and diet. Med list and most recent labs/studies reviewed with pt. GFR is over 60. Trying to be active physically as tolerated. Reports no other new c/o. Urinary Frequency    Associated symptoms include frequency, urgency, abdominal pain and back pain. Pertinent negatives include no nausea, no vomiting, no hematuria and no flank pain. Abdominal Pain   Associated symptoms include abdominal pain and shortness of breath (PATEL). Pertinent negatives include no chest pain and no headaches. Review of Systems   Constitutional: Negative. HENT: Negative. Negative for congestion. Eyes: Negative for blurred vision. Respiratory: Positive for shortness of breath (PATEL). Negative for cough, hemoptysis, sputum production and wheezing. Cardiovascular: Negative for chest pain, palpitations and leg swelling. Gastrointestinal: Positive for abdominal pain. Negative for blood in stool, constipation, diarrhea, heartburn, melena, nausea and vomiting. Genitourinary: Positive for frequency and urgency. Negative for dysuria, flank pain and hematuria. Musculoskeletal: Positive for back pain and joint pain. Negative for falls and neck pain. Skin: Negative.     Neurological: Negative for dizziness, sensory change, focal weakness and headaches. Endo/Heme/Allergies: Negative for polydipsia. Psychiatric/Behavioral: Negative for depression. Physical Exam   Constitutional: He is oriented to person, place, and time. He appears well-developed and well-nourished. No distress. HENT:   Head: Normocephalic and atraumatic. Mouth/Throat: Oropharynx is clear and moist.   Eyes: Conjunctivae are normal. No scleral icterus. Neck: Normal range of motion. Neck supple. No JVD present. No thyromegaly present. Cardiovascular: Normal rate and regular rhythm. Murmur (2/6 ARNAUD, chronic) heard. Pulmonary/Chest: Effort normal and breath sounds normal. No respiratory distress. He has no wheezes. He has no rales. He exhibits tenderness (left upper side, chronic). Abdominal: Soft. Bowel sounds are normal. He exhibits no distension and no mass. There is tenderness ( lower/suprapubic). There is no rebound and no guarding. Musculoskeletal: Normal range of motion. He exhibits no edema or tenderness. Neurological: He is alert and oriented to person, place, and time. Coordination normal.   Skin: Skin is warm and dry. No rash noted. Psychiatric: He has a normal mood and affect. His behavior is normal.   Nursing note and vitals reviewed. ASSESSMENT and PLAN  Jennifer Plata was seen today for urinary frequency, benign prostatic hypertrophy and abdominal pain.     Diagnoses and all orders for this visit:    Lower abdominal pain  -     CBC W/O DIFF  -     METABOLIC PANEL, COMPREHENSIVE  -     PSA - PROSTATE SPECIFIC AG  -     TSH 3RD GENERATION  -     CRP, HIGH SENSITIVITY  -     SED RATE (ESR)  -     CT ABD W WO CONT; Future  -     CT PELV W WO CONT; Future  -     REFERRAL TO COLON AND RECTAL SURGERY    BPH (benign prostatic hypertrophy) with urinary obstruction  -     PSA - PROSTATE SPECIFIC AG    Systolic CHF, chronic (HCC)    Weight loss  -     CBC W/O DIFF  -     METABOLIC PANEL, COMPREHENSIVE  -     PSA - PROSTATE SPECIFIC AG  -     TSH 3RD GENERATION  -     CRP, HIGH SENSITIVITY  -     SED RATE (ESR)  -     CT ABD W WO CONT; Future  -     CT PELV W WO CONT; Future  -     REFERRAL TO COLON AND RECTAL SURGERY    Systolic murmur    Allergy to iodine    Other orders  -     predniSONE (DELTASONE) 50 mg tablet; Take one orally at 13 hrs,7 hrs and 1 hr before contrast for CT scan  -     diphenhydrAMINE (BENADRYL ALLERGY) 25 mg tablet; Take 2 orally 1 hr before contrast CT scan      Follow-up Disposition:  Return in about 1 month (around 3/20/2017).    lab results and schedule of future lab studies reviewed with patient  reviewed diet, exercise and weight control  reviewed medications and side effects in detail  Will check labs and redo ct scan with contrast   Explained to pt my findings and plan

## 2017-02-20 NOTE — PROGRESS NOTES
Reviewed record in preparation for visit and have obtained necessary documentation. Identified pt with two pt identifiers(name and ). Health Maintenance Due   Topic    DTaP/Tdap/Td series (1 - Tdap)    ZOSTER VACCINE AGE 60>     MEDICARE YEARLY EXAM          Chief Complaint   Patient presents with    Urinary Frequency          Learning Assessment:  :     Learning Assessment 2/3/2014 2014   PRIMARY LEARNER Patient Patient   HIGHEST LEVEL OF EDUCATION - PRIMARY LEARNER  - GRADUATED HIGH SCHOOL OR GED   BARRIERS PRIMARY LEARNER - NONE   CO-LEARNER CAREGIVER - No   PRIMARY LANGUAGE ENGLISH ENGLISH   LEARNER PREFERENCE PRIMARY READING READING   ANSWERED BY patient patient    RELATIONSHIP SELF SELF       Depression Screening:  :     PHQ 2 / 9, over the last two weeks 2016   Little interest or pleasure in doing things Not at all   Feeling down, depressed or hopeless Not at all   Total Score PHQ 2 0       Fall Risk Assessment:  :     Fall Risk Assessment, last 12 mths 2016   Able to walk? Yes   Fall in past 12 months? No       Abuse Screening:  :     Abuse Screening Questionnaire 2015   Do you ever feel afraid of your partner? N N   Are you in a relationship with someone who physically or mentally threatens you? N N   Is it safe for you to go home? Y Y       Coordination of Care Questionnaire:  :     1) Have you been to an emergency room, urgent care clinic since your last visit? yes   Hospitalized since your last visit? no             2) Have you seen or consulted any other health care providers outside of [facility] since your last visit? no  (Include any pap smears or colon screenings in this section.)    3) Do you have an Advance Directive on file? yes    4) Are you interested in receiving information on Advance Directives?  NO      Patient is accompanied by patient I have received verbal consent from Nicolas Winchester to discuss any/all medical information while they are present in the room.

## 2017-02-20 NOTE — MR AVS SNAPSHOT
Visit Information Date & Time Provider Department Dept. Phone Encounter #  
 2/20/2017  1:00 PM Esa Villalba, [address] [facility] [phone] [id number] Follow-up Instructions Return in about 1 month (around 3/20/2017). Your Appointments 4/14/2017  9:45 AM  
ROUTINE CARE with Esa Villalba DO [facility] ([address]) Appt Note: 3 month f/u  
 [address] [facility] [address]  
[address] [facility]  
  
    
 6/26/2017 10:00 AM  
ESTABLISHED PATIENT with Patricia Seals MD  
[facility] ([address]) Appt Note: June f/u  
 [address] [facility] [address] [place] [address] [address] [facility] 7 [address] Upcoming Health Maintenance Date Due DTaP/Tdap/Td series (1 - Tdap) 11/19/1956 ZOSTER VACCINE AGE 60> 11/19/1995 MEDICARE YEARLY EXAM 12/22/2016 Pneumococcal 65+ Low/Medium Risk (2 of 2 - PPSV23) 5/20/2017 GLAUCOMA SCREENING Q2Y 1/3/2019 Allergies as of 2/20/2017  Review Complete On: 2/20/2017 By: Esa Villalba DO Severity Noted Reaction Type Reactions Iodine High 02/22/2010   Intolerance Nausea and Vomiting Current Immunizations  Reviewed on 11/1/2016 Name Date Influenza High Dose Vaccine PF 9/6/2016, 9/29/2015 Influenza Vaccine 9/29/2015, 11/6/2013 Influenza Vaccine Split 10/12/2011 11:19 AM  
 Pneumococcal Conjugate (PCV-13) 5/20/2016 Not reviewed this visit You Were Diagnosed With   
  
 Codes Comments Lower abdominal pain    -  Primary ICD-10-CM: R10.30 ICD-9-CM: 789.09   
 BPH (benign prostatic hypertrophy) with urinary obstruction     ICD-10-CM: N40.1, N13.8 ICD-9-CM: 600.01, 599.69 Systolic CHF, chronic (HCC)     ICD-10-CM: I50.22 ICD-9-CM: 428.22, 428.0 Weight loss     ICD-10-CM: R63.4 ICD-9-CM: 783.21   
 Systolic murmur     EDX-18-EC: R01.1 ICD-9-CM: 210. 2 Vitals BP Pulse Temp Resp Height(growth percentile) Weight(growth percentile) (!) 74/59 (BP 1 Location: Right arm, BP Patient Position: Sitting) 61 96.8 °F (36 °C) (Oral) 17 5' 7\" (1.702 m) 146 lb (66.2 kg) SpO2 BMI Smoking Status 99% 22.87 kg/m2 Never Smoker BMI and BSA Data Body Mass Index Body Surface Area  
 22.87 kg/m 2 1.77 m 2 Preferred Pharmacy Pharmacy Name Phone [address] [facility] AT [facility]. [phone] Your Updated Medication List  
  
   
This list is accurate as of: 2/20/17  2:06 PM.  Always use your most recent med list.  
  
  
  
  
 finasteride 5 mg tablet Commonly known as:  PROSCAR Take 1 Tab by mouth daily. losartan 50 mg tablet Commonly known as:  COZAAR Take 2 Tabs by mouth daily. tamsulosin 0.4 mg capsule Commonly known as:  FLOMAX TK 1 C PO QHS We Performed the Following CBC W/O DIFF [39211 CPT(R)] CRP, HIGH SENSITIVITY [41150 CPT(R)] METABOLIC PANEL, COMPREHENSIVE [16610 CPT(R)] PROSTATE SPECIFIC AG (PSA) [id number] CPT(R)] REFERRAL TO COLON AND RECTAL SURGERY [REF17 Custom] Comments:  
 Please evaluate patient for lower abd pain/abnormal CT in 7/16. SED RATE (ESR) [id number] CPT(R)] TSH 3RD GENERATION [84353 CPT(R)] Follow-up Instructions Return in about 1 month (around 3/20/2017). To-Do List   
 02/20/2017 Imaging:  CT PELV W WO CONT   
  
 02/27/2017 Imaging:  CT ABD W WO CONT Referral Information Referral ID Referred By Referred To  
  
 [id number] Luis M sEtevez Not Available Visits Status Start Date End Date 1 New Request 2/20/17 2/20/18 If your referral has a status of pending review or denied, additional information will be sent to support the outcome of this decision. Referral ID Referred By Referred To  
 [id number] Adelaide Settle Not Available Visits Status Start Date End Date 1 New Request 2/20/17 2/20/18 If your referral has a status of pending review or denied, additional information will be sent to support the outcome of this decision. Referral ID Referred By Referred To  
 [id number] Sai Andrews [facility]  
   [address],, [address] Visits Status Start Date End Date 1 New Request 2/20/17 2/20/18 If your referral has a status of pending review or denied, additional information will be sent to support the outcome of this decision. Introducing [facility]! Manav Kulkarni introduces Distra patient portal. Now you can access parts of your medical record, email your doctor's office, and request medication refills online. 1. In your internet browser, go to [email or web address] 2. Click on the First Time User? Click Here link in the Sign In box. You will see the New Member Sign Up page. 3. Enter your Distra Access Code exactly as it appears below. You will not need to use this code after youve completed the sign-up process. If you do not sign up before the expiration date, you must request a new code. · Distra Access Code: [email or web address] Expires: 5/9/2017  2:39 PM 
 
4. Enter the last four digits of your Social Security Number (xxxx) and Date of Birth (mm/dd/yyyy) as indicated and click Submit. You will be taken to the next sign-up page. 5. Create a Redbiotect ID. This will be your Redbiotect login ID and cannot be changed, so think of one that is secure and easy to remember. 6. Create a Redbiotect password. You can change your password at any time. 7. Enter your Password Reset Question and Answer. This can be used at a later time if you forget your password. 8. Enter your e-mail address. You will receive e-mail notification when new information is available in [address] 9. Click Sign Up. You can now view and download portions of your medical record. 10. Click the Download Summary menu link to download a portable copy of your medical information. If you have questions, please visit the Frequently Asked Questions section of the mTraks website. Remember, mTraks is NOT to be used for urgent needs. For medical emergencies, dial 911. Now available from your iPhone and Android! Please provide this summary of care documentation to your next provider. Your primary care clinician is listed as Supriya Rojo. If you have any questions after today's visit, please call [phone].

## 2017-02-21 LAB
ALBUMIN SERPL-MCNC: 4.1 G/DL (ref 3.5–4.7)
ALBUMIN/GLOB SERPL: 1.4 {RATIO} (ref 1.1–2.5)
ALP SERPL-CCNC: 96 IU/L (ref 39–117)
ALT SERPL-CCNC: 18 IU/L (ref 0–44)
AST SERPL-CCNC: 19 IU/L (ref 0–40)
BILIRUB SERPL-MCNC: 0.5 MG/DL (ref 0–1.2)
BUN SERPL-MCNC: 17 MG/DL (ref 8–27)
BUN/CREAT SERPL: 14 (ref 10–22)
CALCIUM SERPL-MCNC: 9.6 MG/DL (ref 8.6–10.2)
CHLORIDE SERPL-SCNC: 105 MMOL/L (ref 96–106)
CO2 SERPL-SCNC: 22 MMOL/L (ref 18–29)
CREAT SERPL-MCNC: 1.23 MG/DL (ref 0.76–1.27)
CRP SERPL HS-MCNC: 0.59 MG/L (ref 0–3)
ERYTHROCYTE [DISTWIDTH] IN BLOOD BY AUTOMATED COUNT: 15.2 % (ref 12.3–15.4)
ERYTHROCYTE [SEDIMENTATION RATE] IN BLOOD BY WESTERGREN METHOD: 19 MM/HR (ref 0–30)
GLOBULIN SER CALC-MCNC: 3 G/DL (ref 1.5–4.5)
GLUCOSE SERPL-MCNC: 91 MG/DL (ref 65–99)
HCT VFR BLD AUTO: 38.4 % (ref 37.5–51)
HGB BLD-MCNC: 12.9 G/DL (ref 12.6–17.7)
INTERPRETATION: NORMAL
MCH RBC QN AUTO: 28.9 PG (ref 26.6–33)
MCHC RBC AUTO-ENTMCNC: 33.6 G/DL (ref 31.5–35.7)
MCV RBC AUTO: 86 FL (ref 79–97)
PLATELET # BLD AUTO: 177 X10E3/UL (ref 150–379)
POTASSIUM SERPL-SCNC: 5.1 MMOL/L (ref 3.5–5.2)
PROT SERPL-MCNC: 7.1 G/DL (ref 6–8.5)
PSA SERPL-MCNC: 2.1 NG/ML (ref 0–4)
RBC # BLD AUTO: 4.46 X10E6/UL (ref 4.14–5.8)
SODIUM SERPL-SCNC: 143 MMOL/L (ref 134–144)
TSH SERPL DL<=0.005 MIU/L-ACNC: 0.72 UIU/ML (ref 0.45–4.5)
WBC # BLD AUTO: 6.5 X10E3/UL (ref 3.4–10.8)

## 2017-02-23 ENCOUNTER — HOSPITAL ENCOUNTER (EMERGENCY)
Age: 82
Discharge: HOME | End: 2017-02-23
Attending: EMERGENCY MEDICINE | Admitting: EMERGENCY MEDICINE
Payer: MEDICARE

## 2017-02-23 ENCOUNTER — APPOINTMENT (OUTPATIENT)
Dept: CT IMAGING | Age: 82
End: 2017-02-23
Attending: EMERGENCY MEDICINE
Payer: MEDICARE

## 2017-02-23 VITALS
WEIGHT: 145 LBS | HEART RATE: 68 BPM | SYSTOLIC BLOOD PRESSURE: 138 MMHG | OXYGEN SATURATION: 99 % | HEIGHT: 67 IN | BODY MASS INDEX: 22.76 KG/M2 | DIASTOLIC BLOOD PRESSURE: 83 MMHG | RESPIRATION RATE: 15 BRPM | TEMPERATURE: 98 F

## 2017-02-23 DIAGNOSIS — R10.13 ABDOMINAL PAIN, EPIGASTRIC: Primary | ICD-10-CM

## 2017-02-23 LAB
ALBUMIN SERPL BCP-MCNC: 3.8 G/DL (ref 3.5–5)
ALBUMIN/GLOB SERPL: 1 {RATIO} (ref 1.1–2.2)
ALP SERPL-CCNC: 111 U/L (ref 45–117)
ALT SERPL-CCNC: 23 U/L (ref 12–78)
ANION GAP BLD CALC-SCNC: 8 MMOL/L (ref 5–15)
APPEARANCE UR: CLEAR
AST SERPL W P-5'-P-CCNC: 9 U/L (ref 15–37)
BASOPHILS # BLD AUTO: 0 K/UL (ref 0–0.1)
BASOPHILS # BLD: 0 % (ref 0–1)
BILIRUB SERPL-MCNC: 0.4 MG/DL (ref 0.2–1)
BILIRUB UR QL: NEGATIVE
BUN SERPL-MCNC: 18 MG/DL (ref 6–20)
BUN/CREAT SERPL: 15 (ref 12–20)
CALCIUM SERPL-MCNC: 9 MG/DL (ref 8.5–10.1)
CHLORIDE SERPL-SCNC: 109 MMOL/L (ref 97–108)
CK MB CFR SERPL CALC: 1.3 % (ref 0–2.5)
CK MB SERPL-MCNC: 1.2 NG/ML (ref 5–25)
CK SERPL-CCNC: 91 U/L (ref 39–308)
CO2 SERPL-SCNC: 25 MMOL/L (ref 21–32)
COLOR UR: NORMAL
CREAT SERPL-MCNC: 1.24 MG/DL (ref 0.7–1.3)
EOSINOPHIL # BLD: 0.1 K/UL (ref 0–0.4)
EOSINOPHIL NFR BLD: 1 % (ref 0–7)
ERYTHROCYTE [DISTWIDTH] IN BLOOD BY AUTOMATED COUNT: 15.1 % (ref 11.5–14.5)
GLOBULIN SER CALC-MCNC: 4 G/DL (ref 2–4)
GLUCOSE SERPL-MCNC: 97 MG/DL (ref 65–100)
GLUCOSE UR STRIP.AUTO-MCNC: NEGATIVE MG/DL
HCT VFR BLD AUTO: 38.1 % (ref 36.6–50.3)
HGB BLD-MCNC: 12.8 G/DL (ref 12.1–17)
HGB UR QL STRIP: NEGATIVE
KETONES UR QL STRIP.AUTO: NEGATIVE MG/DL
LEUKOCYTE ESTERASE UR QL STRIP.AUTO: NEGATIVE
LIPASE SERPL-CCNC: 139 U/L (ref 73–393)
LYMPHOCYTES # BLD AUTO: 14 % (ref 12–49)
LYMPHOCYTES # BLD: 0.9 K/UL (ref 0.8–3.5)
MCH RBC QN AUTO: 29.2 PG (ref 26–34)
MCHC RBC AUTO-ENTMCNC: 33.6 G/DL (ref 30–36.5)
MCV RBC AUTO: 86.8 FL (ref 80–99)
MONOCYTES # BLD: 0.2 K/UL (ref 0–1)
MONOCYTES NFR BLD AUTO: 4 % (ref 5–13)
NEUTS SEG # BLD: 5 K/UL (ref 1.8–8)
NEUTS SEG NFR BLD AUTO: 81 % (ref 32–75)
NITRITE UR QL STRIP.AUTO: NEGATIVE
PH UR STRIP: 6 [PH] (ref 5–8)
PLATELET # BLD AUTO: 168 K/UL (ref 150–400)
POTASSIUM SERPL-SCNC: 4.1 MMOL/L (ref 3.5–5.1)
PROT SERPL-MCNC: 7.8 G/DL (ref 6.4–8.2)
PROT UR STRIP-MCNC: NEGATIVE MG/DL
RBC # BLD AUTO: 4.39 M/UL (ref 4.1–5.7)
SODIUM SERPL-SCNC: 142 MMOL/L (ref 136–145)
SP GR UR REFRACTOMETRY: 1.02 (ref 1–1.03)
TROPONIN I BLD-MCNC: <0.04 NG/ML (ref 0–0.08)
TROPONIN I SERPL-MCNC: <0.04 NG/ML
UROBILINOGEN UR QL STRIP.AUTO: 1 EU/DL (ref 0.2–1)
WBC # BLD AUTO: 6.1 K/UL (ref 4.1–11.1)

## 2017-02-23 PROCEDURE — 83690 ASSAY OF LIPASE: CPT | Performed by: EMERGENCY MEDICINE

## 2017-02-23 PROCEDURE — 82550 ASSAY OF CK (CPK): CPT | Performed by: EMERGENCY MEDICINE

## 2017-02-23 PROCEDURE — 74176 CT ABD & PELVIS W/O CONTRAST: CPT

## 2017-02-23 PROCEDURE — 36415 COLL VENOUS BLD VENIPUNCTURE: CPT | Performed by: EMERGENCY MEDICINE

## 2017-02-23 PROCEDURE — 93005 ELECTROCARDIOGRAM TRACING: CPT

## 2017-02-23 PROCEDURE — 80053 COMPREHEN METABOLIC PANEL: CPT | Performed by: EMERGENCY MEDICINE

## 2017-02-23 PROCEDURE — 84484 ASSAY OF TROPONIN QUANT: CPT | Performed by: EMERGENCY MEDICINE

## 2017-02-23 PROCEDURE — 99284 EMERGENCY DEPT VISIT MOD MDM: CPT

## 2017-02-23 PROCEDURE — 85025 COMPLETE CBC W/AUTO DIFF WBC: CPT | Performed by: EMERGENCY MEDICINE

## 2017-02-23 PROCEDURE — 81003 URINALYSIS AUTO W/O SCOPE: CPT | Performed by: EMERGENCY MEDICINE

## 2017-02-23 RX ORDER — SODIUM CHLORIDE 0.9 % (FLUSH) 0.9 %
10 SYRINGE (ML) INJECTION
Status: DISCONTINUED | OUTPATIENT
Start: 2017-02-23 | End: 2017-02-24 | Stop reason: HOSPADM

## 2017-02-24 LAB
ATRIAL RATE: 76 BPM
CALCULATED P AXIS, ECG09: 58 DEGREES
CALCULATED R AXIS, ECG10: -46 DEGREES
CALCULATED T AXIS, ECG11: 165 DEGREES
DIAGNOSIS, 93000: NORMAL
P-R INTERVAL, ECG05: 130 MS
Q-T INTERVAL, ECG07: 448 MS
QRS DURATION, ECG06: 154 MS
QTC CALCULATION (BEZET), ECG08: 504 MS
VENTRICULAR RATE, ECG03: 76 BPM

## 2017-02-24 NOTE — DISCHARGE INSTRUCTIONS
Abdominal Pain: Care Instructions  Your Care Instructions    Abdominal pain has many possible causes. Some aren't serious and get better on their own in a few days. Others need more testing and treatment. If your pain continues or gets worse, you need to be rechecked and may need more tests to find out what is wrong. You may need surgery to correct the problem. Don't ignore new symptoms, such as fever, nausea and vomiting, urination problems, pain that gets worse, and dizziness. These may be signs of a more serious problem. Your doctor may have recommended a follow-up visit in the next 8 to 12 hours. If you are not getting better, you may need more tests or treatment. The doctor has checked you carefully, but problems can develop later. If you notice any problems or new symptoms, get medical treatment right away. Follow-up care is a key part of your treatment and safety. Be sure to make and go to all appointments, and call your doctor if you are having problems. It's also a good idea to know your test results and keep a list of the medicines you take. How can you care for yourself at home? · Rest until you feel better. · To prevent dehydration, drink plenty of fluids, enough so that your urine is light yellow or clear like water. Choose water and other caffeine-free clear liquids until you feel better. If you have kidney, heart, or liver disease and have to limit fluids, talk with your doctor before you increase the amount of fluids you drink. · If your stomach is upset, eat mild foods, such as rice, dry toast or crackers, bananas, and applesauce. Try eating several small meals instead of two or three large ones. · Wait until 48 hours after all symptoms have gone away before you have spicy foods, alcohol, and drinks that contain caffeine. · Do not eat foods that are high in fat. · Avoid anti-inflammatory medicines such as aspirin, ibuprofen (Advil, Motrin), and naproxen (Aleve).  These can cause stomach upset. Talk to your doctor if you take daily aspirin for another health problem. When should you call for help? Call 911 anytime you think you may need emergency care. For example, call if:  · You passed out (lost consciousness). · You pass maroon or very bloody stools. · You vomit blood or what looks like coffee grounds. · You have new, severe belly pain. Call your doctor now or seek immediate medical care if:  · Your pain gets worse, especially if it becomes focused in one area of your belly. · You have a new or higher fever. · Your stools are black and look like tar, or they have streaks of blood. · You have unexpected vaginal bleeding. · You have symptoms of a urinary tract infection. These may include:  ¨ Pain when you urinate. ¨ Urinating more often than usual.  ¨ Blood in your urine. · You are dizzy or lightheaded, or you feel like you may faint. Watch closely for changes in your health, and be sure to contact your doctor if:  · You are not getting better after 1 day (24 hours). Where can you learn more? Go to http://ross-cristina.info/. Enter M963 in the search box to learn more about \"Abdominal Pain: Care Instructions. \"  Current as of: May 27, 2016  Content Version: 11.1  © 2293-2943 [employer]. Care instructions adapted under license by [employer] (which disclaims liability or warranty for this information). If you have questions about a medical condition or this instruction, always ask your healthcare professional. [place] [address] any warranty or liability for your use of this information. We hope that we have addressed all of your medical concerns. The examination and treatment you received in the Emergency Department were for an emergent problem and were not intended as complete care. It is important that you follow up with your healthcare provider(s) for ongoing care.  If your symptoms worsen or do not improve as expected, and you are unable to reach your usual health care provider(s), you should return to the Emergency Department. Today's healthcare is undergoing tremendous change, and patient satisfaction surveys are one of the many tools to assess the quality of medical care. You may receive a survey from the [employer] regarding your experience in the Emergency Department. I hope that your experience has been completely positive, particularly the medical care that I provided. As such, please participate in the survey; anything less than excellent does not meet my expectations or intentions. [facility] and [facility] participate in nationally recognized quality of care measures. If your blood pressure is greater than 120/80, as reported below, we urge that you seek medical care to address the potential of high blood pressure, commonly known as hypertension. Hypertension can be hereditary or can be caused by certain medical conditions, pain, stress, or \"white coat syndrome. \"       Please make an appointment with your health care provider(s) for follow up of your Emergency Department visit. VITALS:   Patient Vitals for the past 8 hrs:   Temp Resp BP SpO2   02/23/17 1908 97.5 °F (36.4 °C) 16 126/78 98 %          Thank you for allowing us to provide you with medical care today. We realize that you have many choices for your emergency care needs. Please choose us in the future for any continued health care needs. Sylvia Morillo, [facility] [place]: [phone]            Recent Results (from the past 24 hour(s))   EKG, 12 LEAD, INITIAL    Collection Time: 02/23/17  7:19 PM   Result Value Ref Range    Ventricular Rate 76 BPM    Atrial Rate 76 BPM    P-R Interval 130 ms    QRS Duration 154 ms    Q-T Interval 448 ms    QTC Calculation (Bezet) 504 ms    Calculated P Axis 58 degrees Calculated R Axis -46 degrees    Calculated T Axis 165 degrees    Diagnosis       Sinus rhythm with premature atrial complexes  Left axis deviation  Left bundle branch block  When compared with ECG of 21-JUL-2016 07:15,  premature atrial complexes are now present  QRS axis shifted left     CBC WITH AUTOMATED DIFF    Collection Time: 02/23/17  7:23 PM   Result Value Ref Range    WBC 6.1 4.1 - 11.1 K/uL    RBC 4.39 4. 10 - 5.70 M/uL    HGB 12.8 12.1 - 17.0 g/dL    HCT 38.1 36.6 - 50.3 %    MCV 86.8 80.0 - 99.0 FL    MCH 29.2 26.0 - 34.0 PG    MCHC 33.6 30.0 - 36.5 g/dL    RDW 15.1 (H) 11.5 - 14.5 %    PLATELET 593 893 - 419 K/uL    NEUTROPHILS 81 (H) 32 - 75 %    LYMPHOCYTES 14 12 - 49 %    MONOCYTES 4 (L) 5 - 13 %    EOSINOPHILS 1 0 - 7 %    BASOPHILS 0 0 - 1 %    ABS. NEUTROPHILS 5.0 1.8 - 8.0 K/UL    ABS. LYMPHOCYTES 0.9 0.8 - 3.5 K/UL    ABS. MONOCYTES 0.2 0.0 - 1.0 K/UL    ABS. EOSINOPHILS 0.1 0.0 - 0.4 K/UL    ABS. BASOPHILS 0.0 0.0 - 0.1 K/UL   METABOLIC PANEL, COMPREHENSIVE    Collection Time: 02/23/17  7:23 PM   Result Value Ref Range    Sodium 142 136 - 145 mmol/L    Potassium 4.1 3.5 - 5.1 mmol/L    Chloride 109 (H) 97 - 108 mmol/L    CO2 25 21 - 32 mmol/L    Anion gap 8 5 - 15 mmol/L    Glucose 97 65 - 100 mg/dL    BUN 18 6 - 20 MG/DL    Creatinine 1.24 0.70 - 1.30 MG/DL    BUN/Creatinine ratio 15 12 - 20      GFR est AA >60 >60 ml/min/1.73m2    GFR est non-AA 56 (L) >60 ml/min/1.73m2    Calcium 9.0 8.5 - 10.1 MG/DL    Bilirubin, total 0.4 0.2 - 1.0 MG/DL    ALT (SGPT) 23 12 - 78 U/L    AST (SGOT) 9 (L) 15 - 37 U/L    Alk.  phosphatase 111 45 - 117 U/L    Protein, total 7.8 6.4 - 8.2 g/dL    Albumin 3.8 3.5 - 5.0 g/dL    Globulin 4.0 2.0 - 4.0 g/dL    A-G Ratio 1.0 (L) 1.1 - 2.2     LIPASE    Collection Time: 02/23/17  7:23 PM   Result Value Ref Range    Lipase 139 73 - 393 U/L   TROPONIN I    Collection Time: 02/23/17  7:23 PM   Result Value Ref Range    Troponin-I, Qt. <0.04 <0.05 ng/mL   CK W/ CKMB & INDEX    Collection Time: 02/23/17  7:23 PM   Result Value Ref Range    CK 91 39 - 308 U/L    CK - MB 1.2 <3.6 NG/ML    CK-MB Index 1.3 0 - 2.5     URINALYSIS W/ RFLX MICROSCOPIC    Collection Time: 02/23/17  9:39 PM   Result Value Ref Range    Color YELLOW/STRAW      Appearance CLEAR CLEAR      Specific gravity 1.019 1.003 - 1.030      pH (UA) 6.0 5.0 - 8.0      Protein NEGATIVE  NEG mg/dL    Glucose NEGATIVE  NEG mg/dL    Ketone NEGATIVE  NEG mg/dL    Bilirubin NEGATIVE  NEG      Blood NEGATIVE  NEG      Urobilinogen 1.0 0.2 - 1.0 EU/dL    Nitrites NEGATIVE  NEG      Leukocyte Esterase NEGATIVE  NEG     POC TROPONIN-I    Collection Time: 02/23/17 10:22 PM   Result Value Ref Range    Troponin-I (POC) <0.04 0.00 - 0.08 ng/mL       Ct Abd Pelv Wo Cont    Result Date: 2/23/2017  INDICATION: abd pain COMPARISON: 7/21/2016. TECHNIQUE: Thin axial images were obtained through the abdomen and pelvis. Coronal and sagittal reconstructions were generated. Oral contrast was not administered. CT dose reduction was achieved through use of a standardized protocol tailored for this examination and automatic exposure control for dose modulation. The absence of intravenous contrast material reduces the sensitivity for evaluation of the solid parenchymal organs of the abdomen. FINDINGS: LUNG BASES: Clear. INCIDENTALLY IMAGED HEART AND MEDIASTINUM: Unremarkable. LIVER: Stable small cyst. GALLBLADDER: Unremarkable. SPLEEN: No mass. PANCREAS: No mass or ductal dilatation. ADRENALS: Unremarkable. KIDNEYS/URETERS: No mass, calculus, or hydronephrosis. STOMACH: Unremarkable. SMALL BOWEL: No dilatation or wall thickening. COLON: Moderate stool. APPENDIX: Absent. PERITONEUM: No ascites or pneumoperitoneum. RETROPERITONEUM: No lymphadenopathy or aortic aneurysm. REPRODUCTIVE ORGANS: Unremarkable. URINARY BLADDER: No mass or calculus. BONES: No destructive bone lesion.  ADDITIONAL COMMENTS: N/A     IMPRESSION: No acute finding or significant change.

## 2017-02-24 NOTE — ED TRIAGE NOTES
Right upper abd pain x few weeks, +urinary frequency, denies fever, denies n/v, denies diarrhea or constipation , pt scheduled for CT scan on Tuesday , pt appears to be uncomfortable, pain worse today

## 2017-02-24 NOTE — ED PROVIDER NOTES
HPI Comments: .80 y.o. male with past medical history significant for thrombocytopaenia, hypercholesterolemia, NPH, heart murmur, alopecia, ED, aortic insuffiencey, GERD, arthritis, HTN, NSTEMI, and appendectomy who presents from [facility] with friend at bedside  with chief complaint of epigastric pain. Pt reports that he was eating at [employer] when he had sudden onset severe RUQ/Epigastric pain. Friend at bedside reports that he was hunched over in pain. Pt also c/o mild CP and SOB. Pt reports that he started to experience abdominal pain 3 weeks ago and is schedule for a CT scan in 5 days. Pt also reports that he has been experiencing frequency and intermittent episodes of increased urine output and decreased urine output. Pt has been put on Flomax recently. Pt reports the pain is mild in the ED. Pt denies N/V/D, constipation, blood in stool, fever, dysuria, and hematuria. There are no other acute medical concerns at this time. Social hx: nonsmoker, no EtOH use  PCP: Diana Powers DO  Note written by Faith Phoenix, as dictated by Araceli Hi MD 8:38 PM        The history is provided by the patient. No  was used.         Past Medical History:   Diagnosis Date    Alopecia     Aortic insufficiency     Arthritis     BPH     ED (erectile dysfunction)     Essential hypertension     GERD (gastroesophageal reflux disease)     Heart murmur     Hypercholesterolemia     Hypertension     states takes no medications    NSTEMI (non-ST elevated myocardial infarction) ([facility] [address] 7/18/2016    Thrombocytopaenia     PT UNAWARE OF THIS DIAGNOSIS, CAN'T GIVE DETAILS 1/25/12       Past Surgical History:   Procedure Laterality Date    HX APPENDECTOMY      HX GI      COLONOSCOPY    TOTAL KNEE ARTHROPLASTY  1993, 2011    LEFT TOTAL KNEE    TOTAL KNEE ARTHROPLASTY  2008    RIGHT TOTAL KNEE         Family History:   Problem Relation Age of Onset    Heart Disease Mother     Cancer Maternal Grandmother      Colon cancer diagnosed at age 80.  Asthma Sister        Social History     Social History    Marital status:      Spouse name: N/A    Number of children: N/A    Years of education: N/A     Occupational History    Not on file. Social History Main Topics    Smoking status: Never Smoker    Smokeless tobacco: Never Used    Alcohol use No    Drug use: No    Sexual activity: Not on file     Other Topics Concern    Not on file     Social History Narrative         ALLERGIES: Iodine    Review of Systems   Constitutional: Negative for fever. Respiratory: Positive for shortness of breath. Cardiovascular: Positive for chest pain. Gastrointestinal: Positive for abdominal pain. Negative for blood in stool, constipation, diarrhea, nausea and vomiting. Genitourinary: Negative for dysuria and hematuria. All other systems reviewed and are negative.       Vitals:    02/23/17 1908   BP: 126/78   Resp: 16   Temp: 97.5 °F (36.4 °C)   SpO2: 98%   Weight: 65.8 kg (145 lb)   Height: 5' 7\" (1.702 m)            Physical Exam   Physical Examination: General appearance - alert, well appearing, and in no distress, oriented to person, place, and time and normal appearing weight  Eyes - pupils equal and reactive, extraocular eye movements intact  Neck - supple, no significant adenopathy  Chest - clear to auscultation, no wheezes, rales or rhonchi, symmetric air entry  Heart - normal rate, regular rhythm, normal S1, S2, no murmurs, rubs, clicks or gallops  Abdomen - soft, nontender, nondistended, no masses or organomegaly  Back exam - full range of motion, no tenderness, palpable spasm or pain on motion  Neurological - alert, oriented, normal speech, no focal findings or movement disorder noted  Musculoskeletal - no joint tenderness, deformity or swelling  Extremities - peripheral pulses normal, no pedal edema, no clubbing or cyanosis  Skin - normal coloration and turgor, no rashes, no suspicious skin lesions noted  MDM  Number of Diagnoses or Management Options  Abdominal pain, epigastric:      Amount and/or Complexity of Data Reviewed  Clinical lab tests: ordered and reviewed  Tests in the radiology section of CPT®: ordered and reviewed  Decide to obtain previous medical records or to obtain history from someone other than the patient: yes  Obtain history from someone other than the patient: yes (friends)  Review and summarize past medical records: yes  Independent visualization of images, tracings, or specimens: yes    Patient Progress  Patient progress: improved    ED Course       Procedures    EKG interpretation: (Preliminary)  Rhythm: normal sinus rhythm; and irregular. Rate (approx.): 76; Axis: left axis deviation; LA interval: normal; QRS interval: prolonged; ST/T wave: non-specific changes; no changes from previous EKG    Pt afebrile, nontoxic, VSS. Abdomen soft, nontender. Will d/c with pcp f/u.

## 2017-04-14 ENCOUNTER — OFFICE VISIT (OUTPATIENT)
Dept: INTERNAL MEDICINE CLINIC | Age: 82
End: 2017-04-14

## 2017-04-14 VITALS
HEART RATE: 44 BPM | BODY MASS INDEX: 23.07 KG/M2 | DIASTOLIC BLOOD PRESSURE: 69 MMHG | TEMPERATURE: 99.1 F | HEIGHT: 67 IN | OXYGEN SATURATION: 100 % | SYSTOLIC BLOOD PRESSURE: 161 MMHG | RESPIRATION RATE: 20 BRPM | WEIGHT: 147 LBS

## 2017-04-14 DIAGNOSIS — I50.22 SYSTOLIC CHF, CHRONIC (HCC): ICD-10-CM

## 2017-04-14 DIAGNOSIS — J30.1 SEASONAL ALLERGIC RHINITIS DUE TO POLLEN: ICD-10-CM

## 2017-04-14 DIAGNOSIS — I10 HYPERTENSION, ESSENTIAL, BENIGN: ICD-10-CM

## 2017-04-14 DIAGNOSIS — R39.15 URINARY URGENCY: Primary | ICD-10-CM

## 2017-04-14 DIAGNOSIS — N40.1 BPH (BENIGN PROSTATIC HYPERTROPHY) WITH URINARY OBSTRUCTION: ICD-10-CM

## 2017-04-14 DIAGNOSIS — N13.8 BPH (BENIGN PROSTATIC HYPERTROPHY) WITH URINARY OBSTRUCTION: ICD-10-CM

## 2017-04-14 RX ORDER — FLUTICASONE PROPIONATE 50 MCG
2 SPRAY, SUSPENSION (ML) NASAL DAILY
Qty: 1 BOTTLE | Refills: 2 | Status: SHIPPED | OUTPATIENT
Start: 2017-04-14

## 2017-04-14 NOTE — PROGRESS NOTES
HISTORY OF PRESENT ILLNESS  Carla Isaacs is a 80 y.o. male. Pt. comes in for f/u. Has multiple medical problems. Reports continued urine frequency and urgency during day. A lot of urine first in am. No pain or blood. No other GI or  issues. All labs, UA and CT scan were stable as documented in CC. Seen by GI MD and referred to . Had rectal exam by  and given Rx for same meds that I have already given him for BPH. On Flomax and Proscar. Followed by cardiology for CHF. Last EF was 25 to 30 %. Reports compliance with medications and diet. Med list and most recent labs/studies reviewed with pt. All look stable. Trying to be active physically as tolerated. Having allergy symptoms with cough and runny nose. Reports no other new c/o. Follow-up   Pertinent negatives include no chest pain, no abdominal pain, no headaches and no shortness of breath. CHF   Pertinent negatives include no chest pain, no abdominal pain, no headaches and no shortness of breath. Cholesterol Problem   Pertinent negatives include no chest pain, no abdominal pain, no headaches and no shortness of breath. Cold Symptoms   Pertinent negatives include no chest pain, no headaches, no sore throat, no shortness of breath and no wheezing. ED Follow-up   Pertinent negatives include no chest pain, no abdominal pain, no headaches and no shortness of breath. Review of Systems   Constitutional: Negative. HENT: Positive for congestion. Negative for sore throat. Eyes: Negative for blurred vision. Respiratory: Positive for cough. Negative for sputum production, shortness of breath and wheezing. Cardiovascular: Negative for chest pain, palpitations and leg swelling. Gastrointestinal: Negative for abdominal pain. Genitourinary: Positive for frequency and urgency. Negative for dysuria, flank pain and hematuria. Musculoskeletal: Positive for joint pain. Negative for back pain and neck pain. Skin: Negative.     Neurological: Negative for dizziness, sensory change, focal weakness and headaches. Endo/Heme/Allergies: Negative for polydipsia. Psychiatric/Behavioral: Negative for depression. Physical Exam   Constitutional: He is oriented to person, place, and time. He appears well-developed and well-nourished. No distress. HENT:   Head: Normocephalic and atraumatic. Mouth/Throat: Oropharynx is clear and moist.   Eyes: Conjunctivae are normal. No scleral icterus. Neck: Normal range of motion. Neck supple. No JVD present. No thyromegaly present. Cardiovascular: Normal rate and regular rhythm. Murmur (2/6 ARNAUD, chronic) heard. Pulmonary/Chest: Effort normal and breath sounds normal. No respiratory distress. He has no wheezes. He has no rales. Abdominal: Soft. Bowel sounds are normal. He exhibits no distension. There is no tenderness. Musculoskeletal: Normal range of motion. He exhibits no edema or tenderness. Neurological: He is alert and oriented to person, place, and time. Coordination normal.   Skin: Skin is warm and dry. No rash noted. Psychiatric: He has a normal mood and affect. His behavior is normal.   Nursing note and vitals reviewed. ASSESSMENT and PLAN  Alison Fishman was seen today for follow-up, chf, benign prostatic hypertrophy, cholesterol problem, cold symptoms and ed follow-up. Diagnoses and all orders for this visit:    Urinary urgency  -     REFERRAL TO UROLOGY    BPH (benign prostatic hypertrophy) with urinary obstruction  -     REFERRAL TO UROLOGY    Systolic CHF, chronic (HCC)    Hypertension, essential, benign    Seasonal allergic rhinitis due to pollen    Other orders  -     fluticasone (FLONASE) 50 mcg/actuation nasal spray; 2 Sprays by Both Nostrils route daily. Follow-up Disposition:  Return in about 4 months (around 8/14/2017).    lab results and schedule of future lab studies reviewed with patient  reviewed diet, exercise and weight control  reviewed medications and side effects in detail  F/u with other MD's as scheduled  Reassurance  Overall stable

## 2017-04-14 NOTE — PROGRESS NOTES
Health Maintenance Due   Topic Date Due    DTaP/Tdap/Td series (1 - Tdap) 11/19/1956    ZOSTER VACCINE AGE 60>  11/19/1995    MEDICARE YEARLY EXAM  12/22/2016       Chief Complaint   Patient presents with    Follow-up     3 month    CHF    Benign Prostatic Hypertrophy    Cholesterol Problem    Cold Symptoms    ED Follow-up     [facility] 2/23/2017        1. Have you been to the ER, urgent care clinic since your last visit? Hospitalized since your last visit? No    2. Have you seen or consulted any other health care providers outside of the [address] [place] [address] since your last visit? Include any pap smears or colon screening. No    3) Do you have an Advance Directive on file? no    4) Are you interested in receiving information on Advance Directives? NO      Patient is accompanied by self I have received verbal consent from Maximo Babinski to discuss any/all medical information while they are present in the room.

## 2017-04-14 NOTE — MR AVS SNAPSHOT
Visit Information Date & Time Provider Department Dept. Phone Encounter #  
 4/14/2017  9:45 AM Diana Leyva, [facility] Internal Medicine [phone] [id number] Follow-up Instructions Return in about 4 months (around 8/14/2017). Your Appointments 4/21/2017  9:45 AM  
PHYSICAL with Mike Meek NP [facility] Medicine ([address]) Appt Note: Medicare Wellness Visit [address] [place] [address]  
[address]  
  
    
 6/26/2017 10:00 AM  
ESTABLISHED PATIENT with Kirill Juarez MD  
[facility] ([address] [facility]) Appt Note: June f/u  
 [address]  [address] [place] [address]  
[address] [address] Upcoming Health Maintenance Date Due DTaP/Tdap/Td series (1 - Tdap) 11/19/1956 ZOSTER VACCINE AGE 60> 11/19/1995 MEDICARE YEARLY EXAM 12/22/2016 Pneumococcal 65+ Low/Medium Risk (2 of 2 - PPSV23) 5/20/2017 GLAUCOMA SCREENING Q2Y 1/3/2019 Allergies as of 4/14/2017  Review Complete On: 4/14/2017 By: Diana Leyva DO Severity Noted Reaction Type Reactions Iodine High 02/22/2010   Intolerance Nausea and Vomiting Current Immunizations  Reviewed on 11/1/2016 Name Date Influenza High Dose Vaccine PF 9/6/2016, 9/29/2015 Influenza Vaccine 9/29/2015, 11/6/2013 Influenza Vaccine Split 10/12/2011 11:19 AM  
 Pneumococcal Conjugate (PCV-13) 5/20/2016 Not reviewed this visit You Were Diagnosed With   
  
 Codes Comments Urinary urgency    -  Primary ICD-10-CM: R39.15 ICD-9-CM: 561.02   
 BPH (benign prostatic hypertrophy) with urinary obstruction     ICD-10-CM: N40.1, N13.8 ICD-9-CM: 600.01, 599.69 Systolic CHF, chronic (HCC)     ICD-10-CM: I50.22 ICD-9-CM: 428.22, 428.0  Hypertension, essential, benign     ICD-10-CM: I10 
 ICD-9-CM: 401.1 Seasonal allergic rhinitis due to pollen     ICD-10-CM: J30.1 ICD-9-CM: 477.0 Vitals BP Pulse Temp Resp Height(growth percentile) Weight(growth percentile) 161/69 (BP 1 Location: Left arm, BP Patient Position: Sitting) (!) 44 99.1 °F (37.3 °C) (Oral) 20 5' 7\" (1.702 m) 147 lb (66.7 kg) SpO2 BMI Smoking Status 100% 23.02 kg/m2 Never Smoker Vitals History BMI and BSA Data Body Mass Index Body Surface Area 23.02 kg/m 2 1.78 m 2 Preferred Pharmacy Pharmacy Name Phone [address] AT [address] [place] [address]. [phone] Your Updated Medication List  
  
   
This list is accurate as of: 17 10:24 AM.  Always use your most recent med list.  
  
  
  
  
 finasteride 5 mg tablet Commonly known as:  PROSCAR Take 1 Tab by mouth daily. fluticasone 50 mcg/actuation nasal spray Commonly known as:  Shannon Lown 2 Sprays by Both Nostrils route daily. losartan 50 mg tablet Commonly known as:  COZAAR Take 2 Tabs by mouth daily. tamsulosin 0.4 mg capsule Commonly known as:  FLOMAX TK 1 C PO QHS Prescriptions Sent to Pharmacy Refills  
 fluticasone (FLONASE) 50 mcg/actuation nasal spray 2 Si Sprays by Both Nostrils route daily. Class: Normal  
 Pharmacy: [id number] [address] [place] [address], [address] [place] [address] [address] AT [address] [place] [address]. Ph #: [phone] Route: Both Nostrils We Performed the Following REFERRAL TO UROLOGY [AFA448 Custom] Comments:  
 Please evaluate patient for urine frequency/urgency. Normal UA and labs. Follow-up Instructions Return in about 4 months (around 2017). Referral Information Referral ID Referred By Referred To  
  
 [id number] [facility] Liz Goldman [address]   
   [place], [address] Visits Status Start Date End Date 1 New Request 4/14/17 4/14/18 If your referral has a status of pending review or denied, additional information will be sent to support the outcome of this decision. Introducing [facility]! Leonela Overton introduces [employer] patient portal. Now you can access parts of your medical record, email your doctor's office, and request medication refills online. 1. In your internet browser, go to [email or web address] 2. Click on the First Time User? Click Here link in the Sign In box. You will see the New Member Sign Up page. 3. Enter your [employer] Access Code exactly as it appears below. You will not need to use this code after youve completed the sign-up process. If you do not sign up before the expiration date, you must request a new code. · [employer] Access Code: [email or web address] Expires: 5/9/2017  3:39 PM 
 
4. Enter the last four digits of your Social Security Number (xxxx) and Date of Birth (mm/dd/yyyy) as indicated and click Submit. You will be taken to the next sign-up page. 5. Create a [employer] ID. This will be your [employer] login ID and cannot be changed, so think of one that is secure and easy to remember. 6. Create a [employer] password. You can change your password at any time. 7. Enter your Password Reset Question and Answer. This can be used at a later time if you forget your password. 8. Enter your e-mail address. You will receive e-mail notification when new information is available in [address] 9. Click Sign Up. You can now view and download portions of your medical record. 10. Click the Download Summary menu link to download a portable copy of your medical information. If you have questions, please visit the Frequently Asked Questions section of the [employer] website. Remember, [employer] is NOT to be used for urgent needs. For medical emergencies, dial 911. Now available from your iPhone and Android! Please provide this summary of care documentation to your next provider. Your primary care clinician is listed as Xu Osullivan. If you have any questions after today's visit, please call [phone].

## 2017-04-21 ENCOUNTER — APPOINTMENT (OUTPATIENT)
Dept: GENERAL RADIOLOGY | Age: 82
End: 2017-04-21
Attending: EMERGENCY MEDICINE
Payer: MEDICARE

## 2017-04-21 ENCOUNTER — OFFICE VISIT (OUTPATIENT)
Dept: INTERNAL MEDICINE CLINIC | Age: 82
End: 2017-04-21

## 2017-04-21 ENCOUNTER — HOSPITAL ENCOUNTER (EMERGENCY)
Age: 82
Discharge: HOME | End: 2017-04-21
Attending: EMERGENCY MEDICINE
Payer: MEDICARE

## 2017-04-21 VITALS
OXYGEN SATURATION: 95 % | TEMPERATURE: 98.4 F | BODY MASS INDEX: 23.07 KG/M2 | WEIGHT: 147 LBS | HEIGHT: 67 IN | HEART RATE: 57 BPM | RESPIRATION RATE: 30 BRPM

## 2017-04-21 VITALS
HEIGHT: 67 IN | RESPIRATION RATE: 16 BRPM | HEART RATE: 71 BPM | OXYGEN SATURATION: 96 % | WEIGHT: 143 LBS | DIASTOLIC BLOOD PRESSURE: 56 MMHG | TEMPERATURE: 98.5 F | SYSTOLIC BLOOD PRESSURE: 141 MMHG | BODY MASS INDEX: 22.44 KG/M2

## 2017-04-21 DIAGNOSIS — R77.8 ELEVATED TROPONIN: ICD-10-CM

## 2017-04-21 DIAGNOSIS — R05.8 PRODUCTIVE COUGH: Primary | ICD-10-CM

## 2017-04-21 DIAGNOSIS — R40.0 DROWSINESS: ICD-10-CM

## 2017-04-21 DIAGNOSIS — Z87.09 H/O EXTRINSIC ASTHMA: ICD-10-CM

## 2017-04-21 DIAGNOSIS — R06.02 SHORTNESS OF BREATH: ICD-10-CM

## 2017-04-21 DIAGNOSIS — R11.2 NAUSEA AND VOMITING, INTRACTABILITY OF VOMITING NOT SPECIFIED, UNSPECIFIED VOMITING TYPE: ICD-10-CM

## 2017-04-21 DIAGNOSIS — R06.2 WHEEZES: ICD-10-CM

## 2017-04-21 DIAGNOSIS — I42.9 CARDIOMYOPATHY, UNSPECIFIED TYPE (HCC): Primary | ICD-10-CM

## 2017-04-21 LAB
ALBUMIN SERPL BCP-MCNC: 2.9 G/DL (ref 3.5–5)
ALBUMIN/GLOB SERPL: 0.7 {RATIO} (ref 1.1–2.2)
ALP SERPL-CCNC: 78 U/L (ref 45–117)
ALT SERPL-CCNC: 14 U/L (ref 12–78)
ANION GAP BLD CALC-SCNC: 8 MMOL/L (ref 5–15)
AST SERPL W P-5'-P-CCNC: 15 U/L (ref 15–37)
BASOPHILS # BLD AUTO: 0 K/UL (ref 0–0.1)
BASOPHILS # BLD: 0 % (ref 0–1)
BILIRUB SERPL-MCNC: 1.1 MG/DL (ref 0.2–1)
BNP SERPL-MCNC: 577 PG/ML (ref 0–100)
BUN SERPL-MCNC: 11 MG/DL (ref 6–20)
BUN/CREAT SERPL: 9 (ref 12–20)
CALCIUM SERPL-MCNC: 8.8 MG/DL (ref 8.5–10.1)
CHLORIDE SERPL-SCNC: 105 MMOL/L (ref 97–108)
CO2 SERPL-SCNC: 27 MMOL/L (ref 21–32)
CREAT SERPL-MCNC: 1.21 MG/DL (ref 0.7–1.3)
EOSINOPHIL # BLD: 0.1 K/UL (ref 0–0.4)
EOSINOPHIL NFR BLD: 1 % (ref 0–7)
ERYTHROCYTE [DISTWIDTH] IN BLOOD BY AUTOMATED COUNT: 14.7 % (ref 11.5–14.5)
GLOBULIN SER CALC-MCNC: 3.9 G/DL (ref 2–4)
GLUCOSE SERPL-MCNC: 85 MG/DL (ref 65–100)
HCT VFR BLD AUTO: 32.5 % (ref 36.6–50.3)
HGB BLD-MCNC: 10.8 G/DL (ref 12.1–17)
LYMPHOCYTES # BLD AUTO: 12 % (ref 12–49)
LYMPHOCYTES # BLD: 1 K/UL (ref 0.8–3.5)
MCH RBC QN AUTO: 28.3 PG (ref 26–34)
MCHC RBC AUTO-ENTMCNC: 33.2 G/DL (ref 30–36.5)
MCV RBC AUTO: 85.3 FL (ref 80–99)
MONOCYTES # BLD: 0.9 K/UL (ref 0–1)
MONOCYTES NFR BLD AUTO: 10 % (ref 5–13)
NEUTS SEG # BLD: 6.9 K/UL (ref 1.8–8)
NEUTS SEG NFR BLD AUTO: 77 % (ref 32–75)
PLATELET # BLD AUTO: 139 K/UL (ref 150–400)
POTASSIUM SERPL-SCNC: 3.9 MMOL/L (ref 3.5–5.1)
PROT SERPL-MCNC: 6.8 G/DL (ref 6.4–8.2)
RBC # BLD AUTO: 3.81 M/UL (ref 4.1–5.7)
SODIUM SERPL-SCNC: 140 MMOL/L (ref 136–145)
TROPONIN I SERPL-MCNC: 0.1 NG/ML
WBC # BLD AUTO: 8.8 K/UL (ref 4.1–11.1)

## 2017-04-21 PROCEDURE — 84484 ASSAY OF TROPONIN QUANT: CPT | Performed by: EMERGENCY MEDICINE

## 2017-04-21 PROCEDURE — 93306 TTE W/DOPPLER COMPLETE: CPT

## 2017-04-21 PROCEDURE — 96374 THER/PROPH/DIAG INJ IV PUSH: CPT

## 2017-04-21 PROCEDURE — 36415 COLL VENOUS BLD VENIPUNCTURE: CPT | Performed by: EMERGENCY MEDICINE

## 2017-04-21 PROCEDURE — 96375 TX/PRO/DX INJ NEW DRUG ADDON: CPT

## 2017-04-21 PROCEDURE — 85025 COMPLETE CBC W/AUTO DIFF WBC: CPT | Performed by: EMERGENCY MEDICINE

## 2017-04-21 PROCEDURE — 71020 XR CHEST PA LAT: CPT

## 2017-04-21 PROCEDURE — 74011000250 HC RX REV CODE- 250: Performed by: EMERGENCY MEDICINE

## 2017-04-21 PROCEDURE — 94640 AIRWAY INHALATION TREATMENT: CPT

## 2017-04-21 PROCEDURE — 77030029684 HC NEB SM VOL KT MONA -A

## 2017-04-21 PROCEDURE — 80053 COMPREHEN METABOLIC PANEL: CPT | Performed by: EMERGENCY MEDICINE

## 2017-04-21 PROCEDURE — 99285 EMERGENCY DEPT VISIT HI MDM: CPT

## 2017-04-21 PROCEDURE — 83880 ASSAY OF NATRIURETIC PEPTIDE: CPT | Performed by: EMERGENCY MEDICINE

## 2017-04-21 PROCEDURE — 74011250636 HC RX REV CODE- 250/636: Performed by: EMERGENCY MEDICINE

## 2017-04-21 RX ORDER — PREDNISONE 50 MG/1
50 TABLET ORAL DAILY
Qty: 3 TAB | Refills: 0 | Status: SHIPPED | OUTPATIENT
Start: 2017-04-21 | End: 2017-04-24

## 2017-04-21 RX ORDER — FUROSEMIDE 10 MG/ML
40 INJECTION INTRAMUSCULAR; INTRAVENOUS
Status: COMPLETED | OUTPATIENT
Start: 2017-04-21 | End: 2017-04-21

## 2017-04-21 RX ORDER — AZITHROMYCIN 250 MG/1
TABLET, FILM COATED ORAL
Qty: 6 TAB | Refills: 0 | Status: SHIPPED | OUTPATIENT
Start: 2017-04-21 | End: 2017-04-26

## 2017-04-21 RX ADMIN — ALBUTEROL SULFATE 1 DOSE: 2.5 SOLUTION RESPIRATORY (INHALATION) at 14:43

## 2017-04-21 RX ADMIN — METHYLPREDNISOLONE SODIUM SUCCINATE 125 MG: 125 INJECTION, POWDER, FOR SOLUTION INTRAMUSCULAR; INTRAVENOUS at 12:45

## 2017-04-21 RX ADMIN — ALBUTEROL SULFATE 1 DOSE: 2.5 SOLUTION RESPIRATORY (INHALATION) at 12:50

## 2017-04-21 RX ADMIN — FUROSEMIDE 40 MG: 10 INJECTION, SOLUTION INTRAMUSCULAR; INTRAVENOUS at 14:54

## 2017-04-21 NOTE — ED NOTES
2:57 PM  Dr. Juanita Ulrich MD: Change of shift. Care of patient taken over from Dr. Eamon Lawrence MD; H&P reviewed, handoff complete. Awaiting labs/imaging/consultant. CONSULT NOTE:  3:27 PM Eamon Lawrence MD spoke with Dr. Sheila Fink MD, Consult for Cardiology. Discussed available diagnostic tests and clinical findings. Provider is in agreement with care plans as outlined. Dr. Sheila Fink MD states he feels the patient may have a URI or bronchitis, however due to his poor EF, will repeat an echo in the ED and have him follow up with cardiology next week.

## 2017-04-21 NOTE — PROGRESS NOTES
HISTORY OF PRESENT ILLNESS  Belinda Quintana is a 80 y.o. male. This is a patient of Dr. Flaquita Clemente who initially was scheduled for wellness visit today, but has complaints of cough. The patient has had productive cough and shortness of breath for several days. He currently denies chest pain, but is feeling short of breath. He has had chills and sweats. He has been taking Robitussin PRN without improvement. The patient has also had some lower abdominal pain, nausea and vomiting. Visit Vitals    BP (P) 121/53    Pulse (!) 57    Temp 98.4 °F (36.9 °C) (Oral)    Resp 30    Ht 5' 7\" (1.702 m)    Wt 147 lb (66.7 kg)    SpO2 95%    BMI 23.02 kg/m2     HPI    Review of Systems   Constitutional: Positive for chills, fever and malaise/fatigue. HENT: Positive for congestion and sore throat. Respiratory: Positive for cough, sputum production and shortness of breath. Cardiovascular: Negative for chest pain and leg swelling. Gastrointestinal: Positive for abdominal pain, nausea and vomiting. Genitourinary: Negative. Musculoskeletal: Negative. Skin: Negative. Neurological: Positive for weakness. Physical Exam   Constitutional: He appears well-nourished. Drowsy   HENT:   Nasal congestion   Cardiovascular: Normal rate and regular rhythm. Pulmonary/Chest: He has wheezes. Productive cough and complaints of shortness of breath in office  Tachypnea- respirations 30  Scattered wheezes   Abdominal: Soft. Bowel sounds are normal. He exhibits no distension. There is no tenderness. Musculoskeletal: He exhibits no edema. Neurological: He is alert. Decreased awareness, with drowsiness  Answering simple questions properly   Skin: Skin is warm and dry. Nursing note and vitals reviewed. ASSESSMENT and PLAN    ICD-10-CM ICD-9-CM   1. Productive cough R05 786.2   2. Shortness of breath R06.02 786.05   3. Drowsiness R40.0 780.09   4.  Nausea and vomiting, intractability of vomiting not specified, unspecified vomiting type R11.2 787.01     Will send patient to ER for further evaluation. Call placed to ER triage nurse. Patient transported to ER via nursing in wheelchair. Reviewed plan of care with patient who acknowledges understanding and agrees.

## 2017-04-21 NOTE — CONSULTS
Cardiology Consult Note      Patient Name: Luis M Aguirre  : 1935 MRN: [id number]  Date: 2017  Time: 3:24 PM    ED Diagnosis: SOB    Primary Cardiologist: Dr. Cammy Reyes Cardiologist: Dr. Simone Shay    Reason for Consult: SOB    Requesting MD: Eliseo Vazquez MD    HPI:  Luis M Aguirre is a 80 y.o. male evaluated in the ED on 2017  for SOB. Past medical history of Alopecia; Aortic insufficiency; Arthritis; BPH; ED (erectile dysfunction); Essential hypertension; GERD (gastroesophageal reflux disease); Heart murmur; Hypercholesterolemia; Hypertension; NSTEMI (non-ST elevated myocardial infarction) ([facility] [address] (2016); and Thrombocytopaenia. Presents to the ED with an approximate 2 week h/o cough productive of yellowish green sputum and SOB. Slowly getting worse over the past 2 weeks. Last evening this patient's sister gave him 2 of her DUO neb treatments which improved his SOB. She states he was very warm to the touch and suspected fever. Following the breathing treatments, his cough was much more productive. Subjective:  Received laying on stretcher in ED bay 10. Currently getting nebulized breathing treatment. States his SOB is much improved with breathing treatment. Denies CP, palpitations or leg edema. He admits to feeling hungry. Assessment and Plan     1. Acute bronchitis -    - associated cough productive of yellowish-green sputum   - SOB, improved with nebulizer treatments   - CXR clear without evidence for PNA or edema   - Beginning subjective fevers  2. Systolic HF - compensated   - Echo 10/2016 EF 32%   - Repeat echo   - Patient has refused AICD in past   - No BB 2/2 bradycardia reaction   - Cozaar 50 mg   - NYHA class II  3. Dyslipidemia   - Atorvastatin    Compensated HF, CXR clear and no edema. 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   Associated cough productive of yellowish-green sputum. SOB improved with nebulizer treatments. Will obtain echo here in ED. Stable from a cardiac standpoint and does not need admission for cardiac reasons. Needs no further cardiac testing at this time. Will have office contact patient for follow up. Saw and evaluated pt and agree with above assessment and plan. Compensated from cardiac standpoint. Dyspnea likely URI/bronchitis. No additional cardiac evaluation indicated at this time. F/u with Dr. Sylvia Cruz. Patric Craig MD      [facility] 7/19/16 No CAD  TTE 7/19/16 LVEF 30%, mod diffuse HK. Severe hypokinesis of the mid anteroseptal, mid inferoseptal, apical septal, and apical lateral wall(s). Mild MR. Mod AS. Mild TR. Cath 2/25/14 no significant epicardial cad, 15mm gradient across aortic valve, lvef 50%    ECHO 10-27-16= dilated LV 32%. 2+ LAE, 1+ NATALIIA,MR, TR  Aortic stenosis-mild. mean of 8-9 mm Hg; Moderate AR    Originally in hospital with neck pain and had elevated troponin and Echo with poor EF, cath with normal cors and declined Lifevest, got hypotensive with Coreg admit 7-18 to 7-23-16, had transient ileus also. He had wall motion abnormalities but no CAD. Has LBBB. Troponin was 8.81, NSTEMI with no obstructive CAD. Review of Symptoms:  Pertinent items are noted in HPI. Previous treatment/evaluation includes echocardiogram and cardiac catheterization . Cardiac risk factors: dyslipidemia, sedentary life style, male gender, hypertension.     Past Medical History:   Diagnosis Date    Alopecia     Aortic insufficiency     Arthritis     BPH     ED (erectile dysfunction)     Essential hypertension     GERD (gastroesophageal reflux disease)     Heart murmur     Hypercholesterolemia     Hypertension     states takes no medications    NSTEMI (non-ST elevated myocardial infarction) ([facility] [address] 7/18/2016    Thrombocytopaenia     PT UNAWARE OF THIS DIAGNOSIS, CAN'T GIVE DETAILS 1/25/12     Past Surgical History:   Procedure Laterality Date    HX APPENDECTOMY      HX GI      COLONOSCOPY    TOTAL KNEE ARTHROPLASTY  1993, 2011    LEFT TOTAL KNEE    TOTAL KNEE ARTHROPLASTY  2008    RIGHT TOTAL KNEE     No current facility-administered medications for this encounter. Current Outpatient Prescriptions   Medication Sig    fluticasone (FLONASE) 50 mcg/actuation nasal spray 2 Sprays by Both Nostrils route daily.  tamsulosin (FLOMAX) 0.4 mg capsule TK 1 C PO QHS    losartan (COZAAR) 50 mg tablet Take 2 Tabs by mouth daily.  finasteride (PROSCAR) 5 mg tablet Take 1 Tab by mouth daily. Allergies   Allergen Reactions    Iodine Nausea and Vomiting      Family History   Problem Relation Age of Onset    Heart Disease Mother     Cancer Maternal Grandmother      Colon cancer diagnosed at age 80.  Asthma Sister       Social History     Social History    Marital status:      Spouse name: N/A    Number of children: N/A    Years of education: N/A     Social History Main Topics    Smoking status: Never Smoker    Smokeless tobacco: Never Used    Alcohol use No    Drug use: No    Sexual activity: Not Asked     Other Topics Concern    None     Social History Narrative       Objective:    Physical Exam    Vitals:   Vitals:    04/21/17 1400 04/21/17 1415 04/21/17 1443 04/21/17 1445   BP: 139/56 125/50  128/55   Pulse: 67 67  64   Resp: 21 23  22   Temp:       SpO2: 98% 98% 100% 100%   Weight:       Height:           General:    Alert, cooperative, no distress, appears stated age. Neck:   Supple, no carotid bruit and no JVD. Back:     Symmetric, normal curvature. Lungs:     Mild end expiratory wheezes to auscultation bilaterally. NO crackles on exam   Heart[de-identified]    Regular rate and rhythm, S1, S2 normal, no murmur, click, rub or gallop. Abdomen:     Soft, non-tender. Bowel sounds normal. .   Extremities:   Extremities normal, atraumatic, no cyanosis or edema.    Vascular:   Pulses - 2+ radials   Skin:   Skin color normal. No rashes or lesions   Neurologic:   CN II-XII grossly intact. Telemetry: normal sinus rhythm    ECG: normal EKG, normal sinus rhythm    Data Review:     Radiology: No acute process    Recent Labs      04/21/17   1244   TROIQ  0.10*     Recent Labs      04/21/17   1244   NA  140   K  3.9   CL  105   CO2  27   BUN  11   CREA  1.21   GLU  85   CA  8.8     Recent Labs      04/21/17   1244   WBC  8.8   HGB  10.8*   HCT  32.5*   PLT  139*     Recent Labs      04/21/17   1244   SGOT  15   AP  78     No results for input(s): TGL, CHOL, LDLC in the last 72 hours. No lab exists for component: HDLC,  HBA1C  No results for input(s): CRP, TSH, TSHEXT, TSHEXT in the last 72 hours.     No lab exists for component: ESR    Carmen Garner PA-C         [facility] [address] [place] [address],[address] [place] [address]     [id number], DO

## 2017-04-21 NOTE — DISCHARGE INSTRUCTIONS
We hope that we have addressed all of your medical concerns. The examination and treatment you received in the Emergency Department were for an emergent problem and were not intended as complete care. It is important that you follow up with your healthcare provider(s) for ongoing care. If your symptoms worsen or do not improve as expected, and you are unable to reach your usual health care provider(s), you should return to the Emergency Department. Today's healthcare is undergoing tremendous change, and patient satisfaction surveys are one of the many tools to assess the quality of medical care. You may receive a survey from the [employer] organization regarding your experience in the Emergency Department. I hope that your experience has been completely positive, particularly the medical care that I provided. As such, please participate in the survey; anything less than excellent does not meet my expectations or intentions. [facility] and [employer] participate in nationally recognized quality of care measures. If your blood pressure is greater than 120/80, as reported below, we urge that you seek medical care to address the potential of high blood pressure, commonly known as hypertension. Hypertension can be hereditary or can be caused by certain medical conditions, pain, stress, or \"white coat syndrome. \"       Please make an appointment with your health care provider(s) for follow up of your Emergency Department visit.        VITALS:   Patient Vitals for the past 8 hrs:   Temp Pulse Resp BP SpO2   04/21/17 1630 98.5 °F (36.9 °C) 76 16 (!) 137/33 95 %   04/21/17 1618 - - - 121/59 98 %   04/21/17 1615 - - - 106/53 95 %   04/21/17 1600 - - - 125/60 100 %   04/21/17 1445 - 64 22 128/55 100 %   04/21/17 1443 - - - - 100 %   04/21/17 1415 - 67 23 125/50 98 %   04/21/17 1400 - 67 21 139/56 98 %   04/21/17 1345 - - - 118/49 -   04/21/17 1034 97.4 °F (36.3 °C) (!) 59 16 119/64 99 %          Thank you for allowing us to provide you with medical care today. We realize that you have many choices for your emergency care needs. Please choose us in the future for any continued health care needs. Aishwarya Marin Sriram Davison, [address] [place], [place] [address]y 20.   Office: [phone]            Recent Results (from the past 24 hour(s))   CBC WITH AUTOMATED DIFF    Collection Time: 04/21/17 12:44 PM   Result Value Ref Range    WBC 8.8 4.1 - 11.1 K/uL    RBC 3.81 (L) 4.10 - 5.70 M/uL    HGB 10.8 (L) 12.1 - 17.0 g/dL    HCT 32.5 (L) 36.6 - 50.3 %    MCV 85.3 80.0 - 99.0 FL    MCH 28.3 26.0 - 34.0 PG    MCHC 33.2 30.0 - 36.5 g/dL    RDW 14.7 (H) 11.5 - 14.5 %    PLATELET 026 (L) 833 - 400 K/uL    NEUTROPHILS 77 (H) 32 - 75 %    LYMPHOCYTES 12 12 - 49 %    MONOCYTES 10 5 - 13 %    EOSINOPHILS 1 0 - 7 %    BASOPHILS 0 0 - 1 %    ABS. NEUTROPHILS 6.9 1.8 - 8.0 K/UL    ABS. LYMPHOCYTES 1.0 0.8 - 3.5 K/UL    ABS. MONOCYTES 0.9 0.0 - 1.0 K/UL    ABS. EOSINOPHILS 0.1 0.0 - 0.4 K/UL    ABS. BASOPHILS 0.0 0.0 - 0.1 K/UL   METABOLIC PANEL, COMPREHENSIVE    Collection Time: 04/21/17 12:44 PM   Result Value Ref Range    Sodium 140 136 - 145 mmol/L    Potassium 3.9 3.5 - 5.1 mmol/L    Chloride 105 97 - 108 mmol/L    CO2 27 21 - 32 mmol/L    Anion gap 8 5 - 15 mmol/L    Glucose 85 65 - 100 mg/dL    BUN 11 6 - 20 MG/DL    Creatinine 1.21 0.70 - 1.30 MG/DL    BUN/Creatinine ratio 9 (L) 12 - 20      GFR est AA >60 >60 ml/min/1.73m2    GFR est non-AA 58 (L) >60 ml/min/1.73m2    Calcium 8.8 8.5 - 10.1 MG/DL    Bilirubin, total 1.1 (H) 0.2 - 1.0 MG/DL    ALT (SGPT) 14 12 - 78 U/L    AST (SGOT) 15 15 - 37 U/L    Alk.  phosphatase 78 45 - 117 U/L    Protein, total 6.8 6.4 - 8.2 g/dL    Albumin 2.9 (L) 3.5 - 5.0 g/dL    Globulin 3.9 2.0 - 4.0 g/dL    A-G Ratio 0.7 (L) 1.1 - 2.2     TROPONIN I    Collection Time: 04/21/17 12:44 PM   Result Value Ref Range    Troponin-I, Qt. 0.10 (H) <0.05 ng/mL   BNP    Collection Time: 04/21/17 12:44 PM   Result Value Ref Range     (H) 0 - 100 pg/mL       Xr Chest Pa Lat    Result Date: 4/21/2017  Exam:  2 view chest Indication: Cough PA and lateral views demonstrate cardiomegaly. There is no acute process in the lung fields. The osseous structures are unremarkable. Impression: No acute process. Wheezing or Bronchoconstriction: Care Instructions  Your Care Instructions  Wheezing is a whistling noise made during breathing. It occurs when the small airways, or bronchial tubes, that lead to your lungs swell or contract (spasm) and become narrow. This narrowing is called bronchoconstriction. When your airways constrict, it is hard for air to pass through and this makes it hard for you to breathe. Wheezing and bronchoconstriction can be caused by many problems, including:  · An infection such as the flu or a cold. · Allergies such as hay fever. · Diseases such as asthma or chronic obstructive pulmonary disease. · Smoking. Treatment for your wheezing depends on what is causing the problem. Your wheezing may get better without treatment. But you may need to pay attention to things that cause your wheezing and avoid them. Or you may need medicine to help treat the wheezing and to reduce the swelling or to relieve spasms in your lungs. Follow-up care is a key part of your treatment and safety. Be sure to make and go to all appointments, and call your doctor if you are having problems. It is also a good idea to know your test results and keep a list of the medicines you take. How can you care for yourself at home? · Take your medicine exactly as prescribed. Call your doctor if you think you are having a problem with your medicine. You will get more details on the specific medicine your doctor prescribes. · If your doctor prescribed antibiotics, take them as directed. Do not stop taking them just because you feel better.  You need to take the full course of antibiotics. · Breathe moist air from a humidifier, hot shower, or sink filled with hot water. This may help ease your symptoms and make it easier for you to breathe. · If you have congestion in your nose and throat, drinking plenty of fluids, especially hot fluids, may help relieve your symptoms. If you have kidney, heart, or liver disease and have to limit fluids, talk with your doctor before you increase the amount of fluids you drink. · If you have mucus in your airways, it may help to breathe deeply and cough. · Do not smoke or allow others to smoke around you. Smoking can make your wheezing worse. If you need help quitting, talk to your doctor about stop-smoking programs and medicines. These can increase your chances of quitting for good. · Avoid things that may cause your wheezing. These may include colds, smoke, air pollution, dust, pollen, pets, cockroaches, stress, and cold air. When should you call for help? Call 911 anytime you think you may need emergency care. For example, call if:  · You have severe trouble breathing. · You passed out (lost consciousness). Call your doctor now or seek immediate medical care if:  · You cough up yellow, dark brown, or bloody mucus (sputum). · You have new or worse shortness of breath. · Your wheezing is not getting better or it gets worse after you start taking your medicine. Watch closely for changes in your health, and be sure to contact your doctor if:  · You do not get better as expected. Where can you learn more? Go to http://ross-cristina.info/. Enter [id number] in the search box to learn more about \"Wheezing or Bronchoconstriction: Care Instructions. \"  Current as of: May 23, 2016  Content Version: 11.2  © 9464-9463 [employer]. Care instructions adapted under license by [employer] (which disclaims liability or warranty for this information).  If you have questions about a medical condition or this instruction, always ask your healthcare professional. [place] [address] any warranty or liability for your use of this information.

## 2017-04-21 NOTE — ED PROVIDER NOTES
HPI Comments: 80 y.o. male with past medical history significant for aortic insufficiency, HTN, NSTEMI, GERD, arthritis, alopecia, heart murmur, hypercholesterolemia, BPH and thrombocytopenia who presents ambulatory from home accompanied by his sister and friend with chief complaint of SOB. Pt states intermittent wheezing and SOB onset 2 weeks ago. Pt's friend states he was running a fever last night. Pt also reports decreased appetite. Pt states he has a history of wheezing. Per pt's friend, pt would not be able to walk 3 blocks today, despite the pt stating he thinks he could. Pt states he has lost approximately 40 lbs over the last year and is unsure why. Pt states his PCP is aware and they are trying to schedule a colonoscopy. Pt denies recent falls. Pt denies a history of anemia. Pt specifically denies leg swelling. There are no other acute medical concerns at this time. Old Chart Review:  Per cardiac catheterization in 7/19/16, pt with poor EF of 25%. Clean coronaries. Social hx: denies tobacco use; denies EtOH use; denies illicit drug use  FMHx: asthma, HTN, DM  PCP: Adelfo Brown DO    Note written by Faith Gay, as dictated by Анна Juan MD 12:00 PM        The history is provided by the patient, a friend and a relative.         Past Medical History:   Diagnosis Date    Alopecia     Aortic insufficiency     Arthritis     BPH     ED (erectile dysfunction)     Essential hypertension     GERD (gastroesophageal reflux disease)     Heart murmur     Hypercholesterolemia     Hypertension     states takes no medications    NSTEMI (non-ST elevated myocardial infarction) ([facility] [address] 7/18/2016    Thrombocytopaenia     PT UNAWARE OF THIS DIAGNOSIS, CAN'T GIVE DETAILS 1/25/12       Past Surgical History:   Procedure Laterality Date    HX APPENDECTOMY      HX GI      COLONOSCOPY    TOTAL KNEE ARTHROPLASTY  1993, 2011    LEFT TOTAL KNEE    TOTAL KNEE ARTHROPLASTY  2008    RIGHT TOTAL KNEE Family History:   Problem Relation Age of Onset    Heart Disease Mother     Cancer Maternal Grandmother      Colon cancer diagnosed at age 80.  Asthma Sister        Social History     Social History    Marital status:      Spouse name: N/A    Number of children: N/A    Years of education: N/A     Occupational History    Not on file. Social History Main Topics    Smoking status: Never Smoker    Smokeless tobacco: Never Used    Alcohol use No    Drug use: No    Sexual activity: Not on file     Other Topics Concern    Not on file     Social History Narrative         ALLERGIES: Iodine    Review of Systems   Constitutional: Positive for appetite change and unexpected weight change. Negative for chills and fever. HENT: Negative for congestion. Eyes: Negative for visual disturbance. Respiratory: Positive for shortness of breath and wheezing. Negative for cough and chest tightness. Cardiovascular: Negative for chest pain and leg swelling. Gastrointestinal: Negative for abdominal pain, diarrhea and vomiting. Genitourinary: Negative for dysuria and frequency. Musculoskeletal: Negative for joint swelling. Skin: Negative for rash. Neurological: Negative for speech difficulty and headaches. All other systems reviewed and are negative. Vitals:    04/21/17 1034   BP: 119/64   Pulse: (!) 59   Resp: 16   Temp: 97.4 °F (36.3 °C)   SpO2: 99%   Weight: 64.9 kg (143 lb)   Height: 5' 7\" (1.702 m)            Physical Exam   Constitutional: He is oriented to person, place, and time. He appears well-developed and well-nourished. No distress. HENT:   Head: Normocephalic and atraumatic. Nose: Nose normal.   Eyes: Conjunctivae are normal. Pupils are equal, round, and reactive to light. No scleral icterus. Neck: Normal range of motion. Neck supple. No JVD present. No tracheal deviation present. No thyromegaly present.    Cardiovascular: Normal rate, regular rhythm and normal heart sounds. No murmur heard. Pulmonary/Chest: Effort normal and breath sounds normal. No respiratory distress. He has no wheezes. He has no rales. Abdominal: Soft. Bowel sounds are normal. He exhibits no mass. There is no tenderness. There is no rebound and no guarding. Musculoskeletal: Normal range of motion. He exhibits no edema. Neurological: He is alert and oriented to person, place, and time. No cranial nerve deficit. Coordination normal.   Skin: Skin is warm and dry. No rash noted. He is not diaphoretic. No erythema. Psychiatric: He has a normal mood and affect. His behavior is normal.   Nursing note and vitals reviewed. Note written by Faith Ghosh, as dictated by Cynthia Mccollum MD 12:00 PM    [facility]  ED Course       Procedures    PROGRESS NOTE:  1:53 PM  Chest x-ray clear.

## 2017-04-21 NOTE — ED NOTES
Pt's wife came out to nurse's station to find nurse. This RN assisted wife and pt. Wife stated pt c/o PIV insertion site stating painful. This RN was able to pull back blood and flush with NS without difficulty. No infiltration noted. Wife requesting PIV to be removed and new PIV to be placed. Pt's arm with PIV placed upon pillow for further comfort. Will let Renzo Velazquez RN aware.

## 2017-04-21 NOTE — PROGRESS NOTES
Chief Complaint   Patient presents with    Annual Wellness Visit    Cough     C/O coughing all night    Asthma     Reviewed record  In preparation for visit and have obtained necessary documentation. 1. Have you been to the ER, urgent care clinic since your last visit? Hospitalized since your last visit? No    2. Have you seen or consulted any other health care providers outside of the [address] [place] [address] since your last visit? Include any pap smears or colon screening.  No    Used 2 patient I. D. 's

## 2017-04-25 ENCOUNTER — TELEPHONE (OUTPATIENT)
Dept: CARDIOLOGY CLINIC | Age: 82
End: 2017-04-25

## 2017-04-25 NOTE — TELEPHONE ENCOUNTER
Patient identified times 2. Patient states he is doing about the same, still coughing up stuff, and has brought some over the counter agents. Advised follow up with primary care. Understanding verbalized.

## 2017-04-25 NOTE — TELEPHONE ENCOUNTER
----- Message from Veronica Barr MD sent at 4/25/2017 10:06 AM EDT -----  Regarding: RE: Follow up per Javier Steve is fine  Fu with us if pcp wants  6/26/2017  10:00 AM   Veronica Barr MD        [facility]  8/8/2017   10:00 AM   Oleksandr Gould DO          ALEXI            ZOHA[facility] SCHED   ----- Message -----     From: Evie Smith RN     Sent: 4/24/2017   9:01 AM       To: Veronica Barr MD  Subject: Follow up per Baron Cy Schmid    Does he need follow up with you this week or should he follow up with PCP? From ER note it looks like visit was due to bronchitis. If you do want to see him this week, what day and time works for you because your schedule is packed?

## 2017-05-16 ENCOUNTER — OFFICE VISIT (OUTPATIENT)
Dept: INTERNAL MEDICINE CLINIC | Age: 82
End: 2017-05-16

## 2017-05-16 VITALS
HEIGHT: 67 IN | HEART RATE: 58 BPM | RESPIRATION RATE: 17 BRPM | SYSTOLIC BLOOD PRESSURE: 127 MMHG | OXYGEN SATURATION: 98 % | DIASTOLIC BLOOD PRESSURE: 76 MMHG | TEMPERATURE: 95.9 F | BODY MASS INDEX: 22.29 KG/M2 | WEIGHT: 142 LBS

## 2017-05-16 DIAGNOSIS — R01.1 SYSTOLIC MURMUR: ICD-10-CM

## 2017-05-16 DIAGNOSIS — I10 HYPERTENSION, ESSENTIAL, BENIGN: ICD-10-CM

## 2017-05-16 DIAGNOSIS — R41.3 MEMORY IMPAIRMENT: Primary | ICD-10-CM

## 2017-05-16 DIAGNOSIS — I50.22 SYSTOLIC CHF, CHRONIC (HCC): ICD-10-CM

## 2017-05-16 RX ORDER — DONEPEZIL HYDROCHLORIDE 5 MG/1
5 TABLET, FILM COATED ORAL
Qty: 30 TAB | Refills: 1 | Status: SHIPPED | OUTPATIENT
Start: 2017-05-16 | End: 2017-05-16 | Stop reason: SDUPTHER

## 2017-05-16 RX ORDER — DONEPEZIL HYDROCHLORIDE 5 MG/1
TABLET, FILM COATED ORAL
Qty: 90 TAB | Refills: 1 | Status: SHIPPED | OUTPATIENT
Start: 2017-05-16 | End: 2017-07-25 | Stop reason: SDUPTHER

## 2017-05-16 RX ORDER — ASPIRIN 81 MG/1
81 TABLET ORAL DAILY
Qty: 30 TAB | Refills: 11 | Status: SHIPPED | OUTPATIENT
Start: 2017-05-16

## 2017-05-16 NOTE — PROGRESS NOTES
Reviewed record in preparation for visit and have obtained necessary documentation. Identified pt with two pt identifiers(name and ). Health Maintenance Due   Topic    DTaP/Tdap/Td series (1 - Tdap)    ZOSTER VACCINE AGE 60>     MEDICARE YEARLY EXAM          Chief Complaint   Patient presents with    Memory Loss          Learning Assessment:  :     Learning Assessment 2/3/2014 2014   PRIMARY LEARNER Patient Patient   HIGHEST LEVEL OF EDUCATION - PRIMARY LEARNER  - GRADUATED HIGH SCHOOL OR GED   BARRIERS PRIMARY LEARNER - NONE   CO-LEARNER CAREGIVER - No   PRIMARY LANGUAGE ENGLISH ENGLISH   LEARNER PREFERENCE PRIMARY READING READING   ANSWERED BY patient patient    RELATIONSHIP SELF SELF       Depression Screening:  :     PHQ over the last two weeks 2017   Little interest or pleasure in doing things Not at all   Feeling down, depressed or hopeless Not at all   Total Score PHQ 2 0       Fall Risk Assessment:  :     Fall Risk Assessment, last 12 mths 2017   Able to walk? Yes   Fall in past 12 months? No       Abuse Screening:  :     Abuse Screening Questionnaire 2015   Do you ever feel afraid of your partner? N N   Are you in a relationship with someone who physically or mentally threatens you? N N   Is it safe for you to go home? Y Y       Coordination of Care Questionnaire:  :     1) Have you been to an emergency room, urgent care clinic since your last visit? no   Hospitalized since your last visit? no             2) Have you seen or consulted any other health care providers outside of [address] [place] [address] since your last visit? no (Include any pap smears or colon screenings in this section.)    3) Do you have an Advance Directive on file? yes    4) Are you interested in receiving information on Advance Directives?  NO      Patient is accompanied by self and daughter I have received verbal consent from Henrietta Sr to discuss any/all medical information while they are present in the room.

## 2017-05-16 NOTE — MR AVS SNAPSHOT
Visit Information Date & Time Provider Department Dept. Phone Encounter #  
 5/16/2017  3:15 [facility] [phone] [id number] Follow-up Instructions Return in about 1 month (around 6/16/2017). Your Appointments 6/26/2017 10:00 AM  
ESTABLISHED PATIENT with Chauncey Cruz MD  
[facility] ([facility] SCHEDULING) Appt Note: June f/u [address] [facility] [address]  
[address] [facility] [address]  
  
    
 8/8/2017 10:00 AM  
ROUTINE CARE with Ricardo Mohr DO [facility] ([address]) Appt Note: 4 month follow up [address] [address] [facility] [address]  
[phone]  
  
   
 [address] Upcoming Health Maintenance Date Due DTaP/Tdap/Td series (1 - Tdap) 11/19/1956 ZOSTER VACCINE AGE 60> 11/19/1995 MEDICARE YEARLY EXAM 12/22/2016 Pneumococcal 65+ Low/Medium Risk (2 of 2 - PPSV23) 5/20/2017 INFLUENZA AGE 9 TO ADULT 8/1/2017 GLAUCOMA SCREENING Q2Y 1/3/2019 Allergies as of 5/16/2017  Review Complete On: 5/16/2017 By: Ricardo Mohr DO Severity Noted Reaction Type Reactions Iodine High 02/22/2010   Intolerance Nausea and Vomiting Current Immunizations  Reviewed on 11/1/2016 Name Date Influenza High Dose Vaccine PF 9/6/2016, 9/29/2015 Influenza Vaccine 9/29/2015, 11/6/2013 Influenza Vaccine Split 10/12/2011 11:19 AM  
 Pneumococcal Conjugate (PCV-13) 5/20/2016 Not reviewed this visit You Were Diagnosed With   
  
 Codes Comments Memory impairment    -  Primary ICD-10-CM: R41.3 ICD-9-CM: 780.93 Systolic CHF, chronic (HCC)     ICD-10-CM: I50.22 ICD-9-CM: 428.22, 428.0 Systolic murmur     JGO-67-TV: R01.1 ICD-9-CM: 785.2 Hypertension, essential, benign     ICD-10-CM: I10 
ICD-9-CM: 401.1 Vitals BP Pulse Temp Resp Height(growth percentile) Weight(growth percentile) 127/76 (!) 58 95.9 °F (35.5 °C) 17 5' 7\" (1.702 m) 142 lb (64.4 kg) SpO2 BMI Smoking Status 98% 22.24 kg/m2 Never Smoker Vitals History BMI and BSA Data Body Mass Index Body Surface Area  
 22.24 kg/m 2 1.74 m 2 Preferred Pharmacy Pharmacy Name Phone [address] AT [address] [place] [address]. [phone] Your Updated Medication List  
  
   
This list is accurate as of: 5/16/17  4:03 PM.  Always use your most recent med list.  
  
  
  
  
 aspirin delayed-release 81 mg tablet Take 1 Tab by mouth daily. donepezil 5 mg tablet Commonly known as:  ARICEPT Take 1 Tab by mouth nightly. finasteride 5 mg tablet Commonly known as:  PROSCAR Take 1 Tab by mouth daily. fluticasone 50 mcg/actuation nasal spray Commonly known as:  Mario Bumpers 2 Sprays by Both Nostrils route daily. losartan 50 mg tablet Commonly known as:  COZAAR Take 2 Tabs by mouth daily. tamsulosin 0.4 mg capsule Commonly known as:  FLOMAX TK 1 C PO QHS Prescriptions Sent to Pharmacy Refills  
 donepezil (ARICEPT) 5 mg tablet 1 Sig: Take 1 Tab by mouth nightly. Class: Normal  
 Pharmacy: [facility] [address] [place] [address], [address] [place] [address] AT [address] [place] [address] Road. Ph #: [phone] Route: Oral  
 aspirin delayed-release 81 mg tablet 11 Sig: Take 1 Tab by mouth daily. Class: Normal  
 Pharmacy: [facility] [address] [place] [address] AT [address] [facility] [address]. Ph #: [phone] Route: Oral  
  
Follow-up Instructions Return in about 1 month (around 6/16/2017). Introducing [facility]! [place] introduces [employer] patient portal. Now you can access parts of your medical record, email your doctor's office, and request medication refills online. 1. In your internet browser, go to [email or web address] 2. Click on the First Time User? Click Here link in the Sign In box. You will see the New Member Sign Up page. 3. Enter your M[employer] Access Code exactly as it appears below. You will not need to use this code after youve completed the sign-up process. If you do not sign up before the expiration date, you must request a new code. · [employer] Access Code: [email or web address] Expires: 8/14/2017  3:50 PM 
 
4. Enter the last four digits of your Social Security Number (xxxx) and Date of Birth (mm/dd/yyyy) as indicated and click Submit. You will be taken to the next sign-up page. 5. Create a [employer] ID. This will be your Mashape login ID and cannot be changed, so think of one that is secure and easy to remember. 6. Create a Mashape password. You can change your password at any time. 7. Enter your Password Reset Question and Answer. This can be used at a later time if you forget your password. 8. Enter your e-mail address. You will receive e-mail notification when new information is available in [address] 9. Click Sign Up. You can now view and download portions of your medical record. 10. Click the Download Summary menu link to download a portable copy of your medical information. If you have questions, please visit the Frequently Asked Questions section of the M[employer]ape website. Remember, M[employer]ape is NOT to be used for urgent needs. For medical emergencies, dial 911. Now available from your iPhone and Android! Please provide this summary of care documentation to your next provider. Your primary care clinician is listed as aRma Mark. If you have any questions after today's visit, please call [phone].

## 2017-05-30 NOTE — PROGRESS NOTES
HISTORY OF PRESENT ILLNESS  Eric Pleitez is a 80 y.o. male. Pt. comes in with his ex-wife for f/u. Has multiple medical problems. She is concerned that patient has had declining memory. Brain MRI showed mild microvascular disease about a year ago. He is still driving but only short distances. He lives alone. Patient has systolic CHF with low EF. Followed by cardiology. Reports having cough but no other related symptoms. Has some arthritic pains. Reports compliance with medications and diet. Med list and most recent labs/studies reviewed with pt. Trying to be active physically as tolerated. Reports no other new c/o. Memory Loss    His past medical history is significant for hypertension. CHF   Associated symptoms include shortness of breath. Pertinent negatives include no chest pain, no abdominal pain and no headaches. Hypertension    Associated symptoms include shortness of breath. Pertinent negatives include no chest pain, no palpitations, no blurred vision, no headaches, no neck pain and no dizziness. Cough   Associated symptoms include shortness of breath. Pertinent negatives include no chest pain, no abdominal pain and no headaches. Follow Up Chronic Condition   Associated symptoms include shortness of breath. Pertinent negatives include no chest pain, no abdominal pain and no headaches. Review of Systems   Constitutional: Negative. HENT: Negative. Negative for congestion. Eyes: Negative for blurred vision. Respiratory: Positive for cough and shortness of breath. Negative for hemoptysis, sputum production and wheezing. Cardiovascular: Negative for chest pain, palpitations and leg swelling. Gastrointestinal: Negative for abdominal pain. Genitourinary: Negative for dysuria. Musculoskeletal: Positive for joint pain. Negative for back pain and neck pain. Skin: Negative. Neurological: Negative for dizziness, sensory change and headaches.    Psychiatric/Behavioral: Positive for memory loss. Negative for depression. The patient is not nervous/anxious and does not have insomnia. Physical Exam   Constitutional: He is oriented to person, place, and time. He appears well-developed and well-nourished. No distress. HENT:   Head: Normocephalic and atraumatic. Mouth/Throat: Oropharynx is clear and moist.   Eyes: Conjunctivae are normal. No scleral icterus. Neck: Normal range of motion. Neck supple. No JVD present. No thyromegaly present. Cardiovascular: Normal rate and regular rhythm. Murmur (2/6 ARNAUD, chronic) heard. Pulmonary/Chest: Effort normal and breath sounds normal. No respiratory distress. He has no wheezes. He has no rales. He exhibits tenderness (left upper side, chronic). Abdominal: Soft. Bowel sounds are normal. He exhibits no distension. There is no tenderness. Musculoskeletal: Normal range of motion. He exhibits no edema. Neurological: He is alert and oriented to person, place, and time. Coordination normal.   Memory is mildly impaired  Answers most questions properly   Skin: Skin is warm and dry. No rash noted. Psychiatric: He has a normal mood and affect. His behavior is normal.   Nursing note and vitals reviewed. ASSESSMENT and Shaina Forward was seen today for memory loss, chf, hypertension, cough and follow up chronic condition. Diagnoses and all orders for this visit:    Memory impairment    Systolic CHF, chronic (HCC)    Systolic murmur    Hypertension, essential, benign    Other orders  -     donepezil (ARICEPT) 5 mg tablet; Take 1 Tab by mouth nightly. -     aspirin delayed-release 81 mg tablet; Take 1 Tab by mouth daily. Follow-up Disposition:  Return in about 1 month (around 6/16/2017).    lab results and schedule of future lab studies reviewed with patient  reviewed diet, exercise and weight control  reviewed medications and side effects in detail  Advised him to do brain exercises like puzzles, reading newspapers, keeping up with daily events

## 2017-07-25 ENCOUNTER — OFFICE VISIT (OUTPATIENT)
Dept: INTERNAL MEDICINE CLINIC | Age: 82
End: 2017-07-25

## 2017-07-25 VITALS
DIASTOLIC BLOOD PRESSURE: 58 MMHG | SYSTOLIC BLOOD PRESSURE: 134 MMHG | HEART RATE: 54 BPM | BODY MASS INDEX: 22.16 KG/M2 | HEIGHT: 67 IN | TEMPERATURE: 98 F | RESPIRATION RATE: 18 BRPM | WEIGHT: 141.2 LBS

## 2017-07-25 DIAGNOSIS — R39.15 URINARY URGENCY: ICD-10-CM

## 2017-07-25 DIAGNOSIS — R41.3 MEMORY IMPAIRMENT: Primary | ICD-10-CM

## 2017-07-25 DIAGNOSIS — F32.A DEPRESSION, UNSPECIFIED DEPRESSION TYPE: ICD-10-CM

## 2017-07-25 DIAGNOSIS — M19.91 PRIMARY OSTEOARTHRITIS, UNSPECIFIED SITE: ICD-10-CM

## 2017-07-25 DIAGNOSIS — N13.8 BPH (BENIGN PROSTATIC HYPERTROPHY) WITH URINARY OBSTRUCTION: ICD-10-CM

## 2017-07-25 DIAGNOSIS — N40.1 BPH (BENIGN PROSTATIC HYPERTROPHY) WITH URINARY OBSTRUCTION: ICD-10-CM

## 2017-07-25 DIAGNOSIS — I50.22 SYSTOLIC CHF, CHRONIC (HCC): ICD-10-CM

## 2017-07-25 LAB
BILIRUB UR QL STRIP: NEGATIVE
GLUCOSE UR-MCNC: NEGATIVE MG/DL
KETONES P FAST UR STRIP-MCNC: NEGATIVE MG/DL
PH UR STRIP: 5 [PH] (ref 4.6–8)
PROT UR QL STRIP: NEGATIVE MG/DL
SP GR UR STRIP: 1.02 (ref 1–1.03)
UA UROBILINOGEN AMB POC: NORMAL (ref 0.2–1)
URINALYSIS CLARITY POC: CLEAR
URINALYSIS COLOR POC: YELLOW
URINE BLOOD POC: NEGATIVE
URINE LEUKOCYTES POC: NEGATIVE
URINE NITRITES POC: NEGATIVE

## 2017-07-25 RX ORDER — SERTRALINE HYDROCHLORIDE 25 MG/1
25 TABLET, FILM COATED ORAL DAILY
Qty: 90 TAB | Refills: 1 | Status: SHIPPED | OUTPATIENT
Start: 2017-07-25

## 2017-07-25 RX ORDER — DONEPEZIL HYDROCHLORIDE 10 MG/1
TABLET, FILM COATED ORAL
Qty: 90 TAB | Refills: 3 | Status: SHIPPED | OUTPATIENT
Start: 2017-07-25 | End: 2017-09-21 | Stop reason: SDUPTHER

## 2017-07-25 NOTE — MR AVS SNAPSHOT
Visit Information Date & Time Provider Department Dept. Phone Encounter #  
 7/25/2017 10:00 AM Jennifer Nur, [address] [facility] Internal Medicine [phone] [id number] Follow-up Instructions Return in about 1 month (around 8/25/2017). Upcoming Health Maintenance Date Due DTaP/Tdap/Td series (1 - Tdap) 11[birth year]19/1956 ZOSTER VACCINE AGE 60> 9/19/1995 MEDICARE YEARLY EXAM 12/22/2016 Pneumococcal 65+ Low/Medium Risk (2 of 2 - PPSV23) 5/20/2017 INFLUENZA AGE 9 TO ADULT 8/1/2017 GLAUCOMA SCREENING Q2Y 1/3/2019 Allergies as of 7/25/2017  Review Complete On: 7/25/2017 By: Jennifer Nur, DO Severity Noted Reaction Type Reactions Iodine High 02/22/2010   Intolerance Nausea and Vomiting Current Immunizations  Reviewed on 11/1/2016 Name Date Influenza High Dose Vaccine PF 9/6/2016, 9/29/2015 Influenza Vaccine 9/29/2015, 11/6/2013 Influenza Vaccine Split 10/12/2011 11:19 AM  
 Pneumococcal Conjugate (PCV-13) 5/20/2016 Not reviewed this visit You Were Diagnosed With   
  
 Codes Comments Memory impairment    -  Primary ICD-10-CM: R41.3 ICD-9-CM: 780.93 Depression, unspecified depression type     ICD-10-CM: F32.9 ICD-9-CM: 464 Systolic CHF, chronic (HCC)     ICD-10-CM: I50.22 ICD-9-CM: 428.22, 428.0 Urinary urgency     ICD-10-CM: R39.15 ICD-9-CM: 303.65   
 BPH (benign prostatic hypertrophy) with urinary obstruction     ICD-10-CM: N40.1, N13.8 ICD-9-CM: 600.01, 599.69 Primary osteoarthritis, unspecified site     ICD-10-CM: M19.91 
ICD-9-CM: 715.10 Vitals BP Pulse Temp Resp Height(growth percentile) Weight(growth percentile) 134/58 (!) 54 98 °F (36.7 °C) (Oral) 18 5' 7\" (1.702 m) 141 lb 3.2 oz (64 kg) BMI Smoking Status 22.12 kg/m2 Never Smoker Vitals History BMI and BSA Data Body Mass Index Body Surface Area  
 22.12 kg/m 2 1.74 m 2 Preferred Pharmacy Pharmacy Name Phone [address] AT [address] [facility]. [phone] Your Updated Medication List  
  
   
This list is accurate as of: 7/25/17 10:56 AM.  Always use your most recent med list.  
  
  
  
  
 aspirin delayed-release 81 mg tablet Take 1 Tab by mouth daily. donepezil 10 mg tablet Commonly known as:  ARICEPT  
TAKE 1 TABLET BY MOUTH EVERY NIGHT  
  
 finasteride 5 mg tablet Commonly known as:  PROSCAR Take 1 Tab by mouth daily. fluticasone 50 mcg/actuation nasal spray Commonly known as:  Star Serve 2 Sprays by Both Nostrils route daily. losartan 50 mg tablet Commonly known as:  COZAAR Take 2 Tabs by mouth daily. sertraline 25 mg tablet Commonly known as:  ZOLOFT Take 1 Tab by mouth daily. tamsulosin 0.4 mg capsule Commonly known as:  FLOMAX TK 1 C PO QHS Prescriptions Sent to Pharmacy Refills  
 donepezil (ARICEPT) 10 mg tablet 3 Sig: TAKE 1 TABLET BY MOUTH EVERY NIGHT Class: Normal  
 Pharmacy: [facility] [address] [place] [address], [facility] [address] [facility] AT [address] [place] [address]. Ph #: [phone]  
 sertraline (ZOLOFT) 25 mg tablet 1 Sig: Take 1 Tab by mouth daily. Class: Normal  
 Pharmacy: [facility] [address] [place] [address], [facility] Main [address] [place] [address] AT [address] [place] [address] Road. Ph #: [phone] Route: Oral  
  
We Performed the Following AMB POC URINALYSIS DIP STICK AUTO W/O MICRO [45636 CPT(R)] Follow-up Instructions Return in about 1 month (around 8/25/2017). To-Do List   
 08/01/2017 Imaging:  CT HEAD WO CONT Referral Information Referral ID Referred By Referred To  
  
 [id number] Harvey Score Not Available Visits Status Start Date End Date 1 New Request 7/25/17 7/25/18  If your referral has a status of pending review or denied, additional information will be sent to support the outcome of this decision. Introducing [facility]! [employer] introduces Zinc software patient portal. Now you can access parts of your medical record, email your doctor's office, and request medication refills online. 1. In your internet browser, go to [email or web address] 2. Click on the First Time User? Click Here link in the Sign In box. You will see the New Member Sign Up page. 3. Enter your Zinc software Access Code exactly as it appears below. You will not need to use this code after youve completed the sign-up process. If you do not sign up before the expiration date, you must request a new code. · Zinc software Access Code: [email or web address] Expires: 8/14/2017  3:50 PM 
 
4. Enter the last four digits of your Social Security Number (xxxx) and Date of Birth (mm/dd/yyyy) as indicated and click Submit. You will be taken to the next sign-up page. 5. Create a Zinc software ID. This will be your Zinc software login ID and cannot be changed, so think of one that is secure and easy to remember. 6. Create a Zinc software password. You can change your password at any time. 7. Enter your Password Reset Question and Answer. This can be used at a later time if you forget your password. 8. Enter your e-mail address. You will receive e-mail notification when new information is available in [address] 9. Click Sign Up. You can now view and download portions of your medical record. 10. Click the Download Summary menu link to download a portable copy of your medical information. If you have questions, please visit the Frequently Asked Questions section of the Zinc software website. Remember, Zinc software is NOT to be used for urgent needs. For medical emergencies, dial 911. Now available from your iPhone and Android! Please provide this summary of care documentation to your next provider. Your primary care clinician is listed as Theador Hence.  If you have any questions after today's visit, please call [phone].

## 2017-07-25 NOTE — PROGRESS NOTES
HISTORY OF PRESENT ILLNESS  Sterling Caldwell is a 80 y.o. male. Pt. comes in with his daughter and ex-wife for f/u. Has multiple medical problems. Patient's memory has been declining over the last year or so. I started Aricept a few weeks ago. They think it has helped. Patient reports being depressed. He lives alone. He is still driving but has had difficulty with directions but no accidents. Continues to have urine urgency/frequency but no dysuria. Saw urologist but no significant findings except BPH. He is taking medicines for it. Family reports unsteady gait as well. No other focal neurological symptoms. Brain MRI a few years ago show some ischemic changes. His CHF has been stable. Has chronic arthritic pains. Reports compliance with medications and diet. Med list and most recent labs/studies reviewed with pt. Trying to be active physically as tolerated. Reports no other new c/o. Memory Loss      Urgency   Pertinent negatives include no chest pain, no abdominal pain and no headaches. Review of Systems   Constitutional: Negative. HENT: Negative. Negative for congestion. Eyes: Negative for blurred vision. Respiratory: Negative for sputum production. Cardiovascular: Negative for chest pain, palpitations and leg swelling. Gastrointestinal: Negative for abdominal pain. Genitourinary: Positive for urgency. Negative for dysuria. Musculoskeletal: Positive for joint pain. Negative for back pain, falls and neck pain. Skin: Negative. Neurological: Negative for dizziness, sensory change and headaches. Psychiatric/Behavioral: Positive for memory loss. Negative for depression. The patient is not nervous/anxious and does not have insomnia. Physical Exam   Constitutional: He is oriented to person, place, and time. He appears well-developed and well-nourished. No distress. HENT:   Head: Normocephalic and atraumatic.    Mouth/Throat: Oropharynx is clear and moist.   Eyes: Conjunctivae are normal. No scleral icterus. Neck: Normal range of motion. Neck supple. No JVD present. No thyromegaly present. Cardiovascular: Normal rate and regular rhythm. Murmur (2/6 ARNAUD, chronic) heard. Pulmonary/Chest: Effort normal and breath sounds normal. No respiratory distress. He has no wheezes. He has no rales. He exhibits tenderness (left upper side, chronic). Abdominal: Soft. Bowel sounds are normal. He exhibits no distension. There is no tenderness. Musculoskeletal: Normal range of motion. He exhibits no edema. Neurological: He is alert and oriented to person, place, and time. Coordination abnormal.   Memory is mildly impaired about same as before answers most questions properly  A+O to place, date, presidents name   Skin: Skin is warm and dry. No rash noted. Psychiatric: He has a normal mood and affect. His behavior is normal.   Nursing note and vitals reviewed. ASSESSMENT and PLAN  Yris Perea was seen today for annual wellness visit, memory loss and urgency. Diagnoses and all orders for this visit:    Memory impairment  -     CT HEAD WO CONT; Future    Depression, unspecified depression type    Systolic CHF, chronic (HCC)    Urinary urgency  -     AMB POC URINALYSIS DIP STICK AUTO W/O MICRO    BPH (benign prostatic hypertrophy) with urinary obstruction    Primary osteoarthritis, unspecified site    Other orders  -     donepezil (ARICEPT) 10 mg tablet; TAKE 1 TABLET BY MOUTH EVERY NIGHT  -     sertraline (ZOLOFT) 25 mg tablet; Take 1 Tab by mouth daily. Follow-up Disposition:  Return in about 1 month (around 8/25/2017).    lab results and schedule of future lab studies reviewed with patient  reviewed diet, exercise and weight control  reviewed medications and side effects in detail  Discussed etiology and treatment of depression and dementia in elderly people with them  We will do a head CT to rule out other possible etiologies including NPH since he is having urine urgency, memory loss, and gait issues

## 2017-07-25 NOTE — PROGRESS NOTES
Chief Complaint   Patient presents with    Annual Wellness Visit    Follow-up     PHQ over the last two weeks 7/25/2017   Little interest or pleasure in doing things Not at all   Feeling down, depressed or hopeless Not at all   Total Score PHQ 2 0     Fall Risk Assessment, last 12 mths 7/25/2017   Able to walk? Yes   Fall in past 12 months? No   Fall with injury? -   Number of falls in past 12 months -   Fall Risk Score -               1. Have you been to the ER, urgent care clinic since your last visit? Hospitalized since your last visit? No    2. Have you seen or consulted any other health care providers outside of the [address] [place] [address] since your last visit? Include any pap smears or colon screening.  No

## 2017-08-04 ENCOUNTER — HOSPITAL ENCOUNTER (OUTPATIENT)
Dept: CT IMAGING | Age: 82
Discharge: HOME | End: 2017-08-04
Attending: INTERNAL MEDICINE
Payer: MEDICARE

## 2017-08-04 DIAGNOSIS — R41.3 MEMORY IMPAIRMENT: ICD-10-CM

## 2017-08-04 PROCEDURE — 70450 CT HEAD/BRAIN W/O DYE: CPT

## 2017-08-04 NOTE — LETTER
8/4/2017 4:30 PM 
 
Mr. Liz Sales [facility] [id number] Dear Christine Ding.: 
 
Please find your most recent results below. Resulted Orders CT HEAD WO CONT Narrative EXAM:  CT HEAD WO CONT INDICATION:   memory loss,unsteady gait, urine urgency COMPARISON July 21, 2016. CONTRAST:  None. TECHNIQUE: Unenhanced CT of the head was performed using 5 mm images. Brain and 
bone windows were generated. CT dose reduction was achieved through use of a 
standardized protocol tailored for this examination and automatic exposure 
control for dose modulation. FINDINGS: 
There is no extra-axial fluid collection hemorrhage or shift. There is some mild 
atrophy and white matter disease. No masses . Impression  
  impression: No acute changes . RECOMMENDATIONS: 
 
No acute problems Mild chronic changes Normal for age  
    
   
 
 
 
Please call me if you have any questions: [phone] Sincerely, [facility] [place] [address]

## 2017-09-21 ENCOUNTER — HOSPITAL ENCOUNTER (OUTPATIENT)
Dept: CT IMAGING | Age: 82
Discharge: HOME | End: 2017-09-21
Attending: NURSE PRACTITIONER
Payer: MEDICARE

## 2017-09-21 ENCOUNTER — DOCUMENTATION ONLY (OUTPATIENT)
Dept: INTERNAL MEDICINE CLINIC | Age: 82
End: 2017-09-21

## 2017-09-21 ENCOUNTER — OFFICE VISIT (OUTPATIENT)
Dept: INTERNAL MEDICINE CLINIC | Age: 82
End: 2017-09-21

## 2017-09-21 VITALS
HEIGHT: 67 IN | SYSTOLIC BLOOD PRESSURE: 144 MMHG | HEART RATE: 67 BPM | OXYGEN SATURATION: 99 % | RESPIRATION RATE: 18 BRPM | WEIGHT: 135 LBS | BODY MASS INDEX: 21.19 KG/M2 | DIASTOLIC BLOOD PRESSURE: 75 MMHG | TEMPERATURE: 97.8 F

## 2017-09-21 DIAGNOSIS — R10.84 GENERALIZED ABDOMINAL PAIN: Primary | ICD-10-CM

## 2017-09-21 DIAGNOSIS — R41.3 MEMORY IMPAIRMENT: ICD-10-CM

## 2017-09-21 DIAGNOSIS — R63.4 WEIGHT LOSS: ICD-10-CM

## 2017-09-21 DIAGNOSIS — R35.0 URINARY FREQUENCY: ICD-10-CM

## 2017-09-21 DIAGNOSIS — I10 HYPERTENSION, ESSENTIAL, BENIGN: ICD-10-CM

## 2017-09-21 DIAGNOSIS — R10.84 GENERALIZED ABDOMINAL PAIN: ICD-10-CM

## 2017-09-21 DIAGNOSIS — R10.30 LOWER ABDOMINAL PAIN: ICD-10-CM

## 2017-09-21 LAB
BILIRUB UR QL STRIP: NORMAL
GLUCOSE UR-MCNC: NORMAL MG/DL
KETONES P FAST UR STRIP-MCNC: NEGATIVE MG/DL
PH UR STRIP: 5.5 [PH] (ref 4.6–8)
PROT UR QL STRIP: NORMAL MG/DL
SP GR UR STRIP: 1.03 (ref 1–1.03)
UA UROBILINOGEN AMB POC: NORMAL (ref 0.2–1)
URINALYSIS CLARITY POC: CLEAR
URINALYSIS COLOR POC: NORMAL
URINE BLOOD POC: NORMAL
URINE LEUKOCYTES POC: NEGATIVE
URINE NITRITES POC: NEGATIVE

## 2017-09-21 PROCEDURE — 74176 CT ABD & PELVIS W/O CONTRAST: CPT

## 2017-09-21 RX ORDER — DONEPEZIL HYDROCHLORIDE 10 MG/1
TABLET, FILM COATED ORAL
Qty: 90 TAB | Refills: 3 | Status: SHIPPED | OUTPATIENT
Start: 2017-09-21

## 2017-09-21 NOTE — PATIENT INSTRUCTIONS
Abdominal Pain: Care Instructions  Your Care Instructions    Abdominal pain has many possible causes. Some aren't serious and get better on their own in a few days. Others need more testing and treatment. If your pain continues or gets worse, you need to be rechecked and may need more tests to find out what is wrong. You may need surgery to correct the problem. Don't ignore new symptoms, such as fever, nausea and vomiting, urination problems, pain that gets worse, and dizziness. These may be signs of a more serious problem. Your doctor may have recommended a follow-up visit in the next 8 to 12 hours. If you are not getting better, you may need more tests or treatment. The doctor has checked you carefully, but problems can develop later. If you notice any problems or new symptoms, get medical treatment right away. Follow-up care is a key part of your treatment and safety. Be sure to make and go to all appointments, and call your doctor if you are having problems. It's also a good idea to know your test results and keep a list of the medicines you take. How can you care for yourself at home? · Rest until you feel better. · To prevent dehydration, drink plenty of fluids, enough so that your urine is light yellow or clear like water. Choose water and other caffeine-free clear liquids until you feel better. If you have kidney, heart, or liver disease and have to limit fluids, talk with your doctor before you increase the amount of fluids you drink. · If your stomach is upset, eat mild foods, such as rice, dry toast or crackers, bananas, and applesauce. Try eating several small meals instead of two or three large ones. · Wait until 48 hours after all symptoms have gone away before you have spicy foods, alcohol, and drinks that contain caffeine. · Do not eat foods that are high in fat. · Avoid anti-inflammatory medicines such as aspirin, ibuprofen (Advil, Motrin), and naproxen (Aleve).  These can cause stomach upset. Talk to your doctor if you take daily aspirin for another health problem. When should you call for help? Call 911 anytime you think you may need emergency care. For example, call if:  · You passed out (lost consciousness). · You pass maroon or very bloody stools. · You vomit blood or what looks like coffee grounds. · You have new, severe belly pain. Call your doctor now or seek immediate medical care if:  · Your pain gets worse, especially if it becomes focused in one area of your belly. · You have a new or higher fever. · Your stools are black and look like tar, or they have streaks of blood. · You have unexpected vaginal bleeding. · You have symptoms of a urinary tract infection. These may include:  ¨ Pain when you urinate. ¨ Urinating more often than usual.  ¨ Blood in your urine. · You are dizzy or lightheaded, or you feel like you may faint. Watch closely for changes in your health, and be sure to contact your doctor if:  · You are not getting better after 1 day (24 hours). Where can you learn more? Go to http://ross-cristina.info/. Enter S629 in the search box to learn more about \"Abdominal Pain: Care Instructions. \"  Current as of: March 20, 2017  Content Version: 11.3  © 2073-7987 [employer]. Care instructions adapted under license by [employer] (which disclaims liability or warranty for this information). If you have questions about a medical condition or this instruction, always ask your healthcare professional. [place] [address] any warranty or liability for your use of this information. High Blood Pressure: Care Instructions  Your Care Instructions  If your blood pressure is usually above 140/90, you have high blood pressure, or hypertension. That means the top number is 140 or higher or the bottom number is 90 or higher, or both.   Despite what a lot of people think, high blood pressure usually doesn't cause headaches or make you feel dizzy or lightheaded. It usually has no symptoms. But it does increase your risk for heart attack, stroke, and kidney or eye damage. The higher your blood pressure, the more your risk increases. Your doctor will give you a goal for your blood pressure. Your goal will be based on your health and your age. An example of a goal is to keep your blood pressure below 140/90. Lifestyle changes, such as eating healthy and being active, are always important to help lower blood pressure. You might also take medicine to reach your blood pressure goal.  Follow-up care is a key part of your treatment and safety. Be sure to make and go to all appointments, and call your doctor if you are having problems. It's also a good idea to know your test results and keep a list of the medicines you take. How can you care for yourself at home? Medical treatment  · If you stop taking your medicine, your blood pressure will go back up. You may take one or more types of medicine to lower your blood pressure. Be safe with medicines. Take your medicine exactly as prescribed. Call your doctor if you think you are having a problem with your medicine. · Talk to your doctor before you start taking aspirin every day. Aspirin can help certain people lower their risk of a heart attack or stroke. But taking aspirin isn't right for everyone, because it can cause serious bleeding. · See your doctor regularly. You may need to see the doctor more often at first or until your blood pressure comes down. · If you are taking blood pressure medicine, talk to your doctor before you take decongestants or anti-inflammatory medicine, such as ibuprofen. Some of these medicines can raise blood pressure. · Learn how to check your blood pressure at home. Lifestyle changes  · Stay at a healthy weight. This is especially important if you put on weight around the waist. Losing even 10 pounds can help you lower your blood pressure.   · If your doctor recommends it, get more exercise. Walking is a good choice. Bit by bit, increase the amount you walk every day. Try for at least 30 minutes on most days of the week. You also may want to swim, bike, or do other activities. · Avoid or limit alcohol. Talk to your doctor about whether you can drink any alcohol. · Try to limit how much sodium you eat to less than 2,300 milligrams (mg) a day. Your doctor may ask you to try to eat less than 1,500 mg a day. · Eat plenty of fruits (such as bananas and oranges), vegetables, legumes, whole grains, and low-fat dairy products. · Lower the amount of saturated fat in your diet. Saturated fat is found in animal products such as milk, cheese, and meat. Limiting these foods may help you lose weight and also lower your risk for heart disease. · Do not smoke. Smoking increases your risk for heart attack and stroke. If you need help quitting, talk to your doctor about stop-smoking programs and medicines. These can increase your chances of quitting for good. When should you call for help? Call 911 anytime you think you may need emergency care. This may mean having symptoms that suggest that your blood pressure is causing a serious heart or blood vessel problem. Your blood pressure may be over 180/110. For example, call 911 if:  · You have symptoms of a heart attack. These may include:  ¨ Chest pain or pressure, or a strange feeling in the chest.  ¨ Sweating. ¨ Shortness of breath. ¨ Nausea or vomiting. ¨ Pain, pressure, or a strange feeling in the back, neck, jaw, or upper belly or in one or both shoulders or arms. ¨ Lightheadedness or sudden weakness. ¨ A fast or irregular heartbeat. · You have symptoms of a stroke. These may include:  ¨ Sudden numbness, tingling, weakness, or loss of movement in your face, arm, or leg, especially on only one side of your body. ¨ Sudden vision changes. ¨ Sudden trouble speaking.   ¨ Sudden confusion or trouble understanding simple statements. ¨ Sudden problems with walking or balance. ¨ A sudden, severe headache that is different from past headaches. · You have severe back or belly pain. Do not wait until your blood pressure comes down on its own. Get help right away. Call your doctor now or seek immediate care if:  · Your blood pressure is much higher than normal (such as 180/110 or higher), but you don't have symptoms. · You think high blood pressure is causing symptoms, such as:  ¨ Severe headache. ¨ Blurry vision. Watch closely for changes in your health, and be sure to contact your doctor if:  · Your blood pressure measures 140/90 or higher at least 2 times. That means the top number is 140 or higher or the bottom number is 90 or higher, or both. · You think you may be having side effects from your blood pressure medicine. · Your blood pressure is usually normal, but it goes above normal at least 2 times. Where can you learn more? Go to http://ross-cristina.info/. Enter B492 in the search box to learn more about \"High Blood Pressure: Care Instructions. \"  Current as of: August 8, 2016  Content Version: 11.3  © 3428-5415 [employer] Care instructions adapted under license by [employer] (which disclaims liability or warranty for this information). If you have questions about a medical condition or this instruction, always ask your healthcare professional. [address] any warranty or liability for your use of this information. CT Scan of the Abdomen: About This Test  What is it? A CT (computed tomography) scan uses X-rays to make detailed pictures of your body and structures inside your body. A CT scan of the abdomen (belly) can give your doctor information about your liver, pancreas, kidneys, and other structures in your belly. During the test, you will lie on a table that is attached to the XODIS.  The CT scanner is a large doughnut-shaped machine. Why is this test done? A CT scan of the belly can help find problems such as kidney stones, infected pouches in the colon (diverticulitis), and appendicitis. It also helps find tumors and abscesses. How can you prepare for the test?  Talk to your doctor about all your health conditions before the test. For example, tell your doctor if:  · You are or might be pregnant. · You are allergic to any medicines. · You have diabetes. · You take metformin. · You are breastfeeding. · You get nervous in confined spaces. You may need medicine to help you relax. · You have had an X-ray test using barium contrast material in the past 4 days. You may be asked to not eat any solid foods starting the night before your scan. What happens before the test?  · You may have to take off jewelry. · You will take off all or most of your clothes and change into a gown. If you do leave some clothes on, make sure you take everything out of your pockets. · You may have contrast material (dye) put into your arm through a tube called an IV. Or, you may drink the contrast material or it may be put through a tube into your bladder or rectum. Contrast material helps doctors see specific organs, blood vessels, and most tumors. What happens during the test?  · You will lie on a table that is attached to the CT scanner. · The table slides into the round opening of the scanner. The table will move during the scan. The scanner moves inside the doughnut-shaped casing around your body. · You will be asked to hold still during the scan. You may be asked to hold your breath for short periods. · You may be alone in the scanning room, but a technologist will be watching you through a window and talking with you during the test.  What else should you know about the test?  · A CT scan does not hurt. · If a dye is used, you may feel a quick sting or pinch when the IV is started.  The dye may make you feel warm and flushed and give you a metallic taste in your mouth. Some people feel sick to their stomach or get a headache. · If you breastfeed and are concerned about whether the dye used in this test is safe, talk to your doctor. Most experts believe that very little dye passes into breast milk and even less is passed on to the baby. But if you prefer, you can store some of your breast milk ahead of time and use it for a day or two after the test.  · The dose of radiation from a CT scanner may be higher than that from other X-ray tests. If you are concerned about the radiation risk, talk to your doctor. How long does the test take? · The test will take about 30 to 60 minutes. Most of this time is spent getting ready for the scan. The actual test only takes a few minutes. What happens after the test?  · You will probably be able to go home right away. · You can go back to your usual activities right away. · Drink plenty of fluids for 24 hours after the test if dye was used, unless your doctor tells you not to. When should you call for help? Watch closely for changes in your health, and be sure to contact your doctor if you have any problems. Follow-up care is a key part of your treatment and safety. Be sure to make and go to all appointments, and call your doctor if you are having problems. It's also a good idea to keep a list of the medicines you take. Ask your doctor when you can expect to have your test results. Where can you learn more? Go to http://ross-cristina.info/. Enter J296 in the search box to learn more about \"CT Scan of the Abdomen: About This Test.\"  Current as of: October 14, 2016  Content Version: 11.3  © 0084-5771 [employer]. Care instructions adapted under license by [employer] (which disclaims liability or warranty for this information).  If you have questions about a medical condition or this instruction, always ask your healthcare professional. Gris Garcia disclaims any warranty or liability for your use of this information.

## 2017-09-21 NOTE — MR AVS SNAPSHOT
Visit Information Date & Time Provider Department Dept. Phone Encounter #  
 9/21/2017  1:00 PM Chelsea Moss, [address] [facility] Internal Medicine [phone] [id number] Upcoming Health Maintenance Date Due DTaP/Tdap/Td series (1 - Tdap) 11/19/1956 ZOSTER VACCINE AGE 60> 9/19/1995 MEDICARE YEARLY EXAM 12/22/2016 Pneumococcal 65+ Low/Medium Risk (2 of 2 - PPSV23) 5/20/2017 INFLUENZA AGE 9 TO ADULT 8/1/2017 GLAUCOMA SCREENING Q2Y 1/3/2019 Allergies as of 9/21/2017  Review Complete On: 9/21/2017 By: Olga Sherman LPN Severity Noted Reaction Type Reactions Iodine High 02/22/2010   Intolerance Nausea and Vomiting Current Immunizations  Reviewed on 11/1/2016 Name Date Influenza High Dose Vaccine PF 9/6/2016, 9/29/2015 Influenza Vaccine 9/29/2015, 11/6/2013 Influenza Vaccine Split 10/12/2011 11:19 AM  
 Pneumococcal Conjugate (PCV-13) 5/20/2016 Not reviewed this visit You Were Diagnosed With   
  
 Codes Comments Generalized abdominal pain    -  Primary ICD-10-CM: R10.84 ICD-9-CM: 789.07 Urinary frequency     ICD-10-CM: R35.0 ICD-9-CM: 788.41 Vitals BP Pulse Temp Resp Height(growth percentile) Weight(growth percentile) 144/75 (BP 1 Location: Right arm, BP Patient Position: Sitting) 67 97.8 °F (36.6 °C) (Oral) 18 5' 7\" (1.702 m) 135 lb (61.2 kg) SpO2 BMI Smoking Status 99% 21.14 kg/m2 Never Smoker Vitals History BMI and BSA Data Body Mass Index Body Surface Area  
 21.14 kg/m 2 1.7 m 2 Preferred Pharmacy Pharmacy Name Phone [address] AT [address] [place] [address]. [phone] Your Updated Medication List  
  
   
This list is accurate as of: 9/21/17  2:44 PM.  Always use your most recent med list.  
  
  
  
  
 aspirin delayed-release 81 mg tablet Take 1 Tab by mouth daily. donepezil 10 mg tablet Commonly known as:  ARICEPT  
TAKE 1 TABLET BY MOUTH EVERY NIGHT  
  
 finasteride 5 mg tablet Commonly known as:  PROSCAR Take 1 Tab by mouth daily. fluticasone 50 mcg/actuation nasal spray Commonly known as:  [employer] 2 Sprays by Both Nostrils route daily. losartan 50 mg tablet Commonly known as:  COZAAR Take 2 Tabs by mouth daily. sertraline 25 mg tablet Commonly known as:  ZOLOFT Take 1 Tab by mouth daily. tamsulosin 0.4 mg capsule Commonly known as:  FLOMAX TK 1 C PO QHS Prescriptions Sent to Pharmacy Refills  
 donepezil (ARICEPT) 10 mg tablet 3 Sig: TAKE 1 TABLET BY MOUTH EVERY NIGHT Class: Normal  
 Pharmacy: [facility] [address], [address] [place] [address] AT [address] [facility]. Ph #: [phone] We Performed the Following CULTURE, URINE [id number] CPT(R)] REFERRAL FOR COLONOSCOPY [HBJ953 Custom] Comments:  
 Please evaluate patient for colonoscopy To-Do List   
 09/22/2017 Imaging:  CT ABD WO CONT Referral Information Referral ID Referred By Referred To  
  
 [id number] [place] [facility] [address] [place] [address] 66 62 83 [place], [address] Visits Status Start Date End Date 1 New Request 9/21/17 9/21/18 If your referral has a status of pending review or denied, additional information will be sent to support the outcome of this decision. Referral ID Referred By Referred To  
 [id number] RICHARD ELLIS Not Available Visits Status Start Date End Date 1 New Request 9/21/17 9/21/18 If your referral has a status of pending review or denied, additional information will be sent to support the outcome of this decision. Introducing [facility]! Tana Vizcaino introduces NextPrinciples patient portal. Now you can access parts of your medical record, email your doctor's office, and request medication refills online. 1. In your internet browser, go to [email or web address] 2. Click on the First Time User? Click Here link in the Sign In box. You will see the New Member Sign Up page. 3. Enter your RevoDeals Access Code exactly as it appears below. You will not need to use this code after youve completed the sign-up process. If you do not sign up before the expiration date, you must request a new code. · RevoDeals Access Code: [email or web address] Expires: 12/20/2017  2:44 PM 
 
4. Enter the last four digits of your Social Security Number (xxxx) and Date of Birth (mm/dd/yyyy) as indicated and click Submit. You will be taken to the next sign-up page. 5. Create a CypherWorXt ID. This will be your RevoDeals login ID and cannot be changed, so think of one that is secure and easy to remember. 6. Create a RevoDeals password. You can change your password at any time. 7. Enter your Password Reset Question and Answer. This can be used at a later time if you forget your password. 8. Enter your e-mail address. You will receive e-mail notification when new information is available in [address] 9. Click Sign Up. You can now view and download portions of your medical record. 10. Click the Download Summary menu link to download a portable copy of your medical information. If you have questions, please visit the Frequently Asked Questions section of the RevoDeals website. Remember, RevoDeals is NOT to be used for urgent needs. For medical emergencies, dial 911. Now available from your iPhone and Android! Please provide this summary of care documentation to your next provider. Your primary care clinician is listed as Yossi Sher. If you have any questions after today's visit, please call [phone].

## 2017-09-21 NOTE — PROGRESS NOTES
Health Maintenance Due   Topic Date Due    DTaP/Tdap/Td series (1 - Tdap) 11/19/1956    ZOSTER VACCINE AGE 60>  09/19/1995    MEDICARE YEARLY EXAM  12/22/2016    Pneumococcal 65+ Low/Medium Risk (2 of 2 - PPSV23) 05/20/2017    INFLUENZA AGE 9 TO ADULT  08/01/2017       Chief Complaint   Patient presents with    Abdominal Pain     Mild pain in lower abdomen, seen before, Pain rated 6/10    Urinary Frequency     Urinating frequently, memory failure    Hypertension    CHF       1. Have you been to the ER, urgent care clinic since your last visit? Hospitalized since your last visit? No    2. Have you seen or consulted any other health care providers outside of the [address] [place] [address] since your last visit? Include any pap smears or colon screening. No    3) Do you have an Advance Directive on file? yes    4) Are you interested in receiving information on Advance Directives? YES      Patient is accompanied by friend I have received verbal consent from [employer] to discuss any/all medical information while they are present in the room. Pt states: Mild pain in lower abdomen, has been seen before for same problem, Pain rated 6/10. C/O  Urinating frequently, with memory failure, wt loss, mood change. Friend who is accompanying patient states she and daughter feel not safe for he to live alone at this time with his memory changes.

## 2017-09-21 NOTE — LETTER
9/22/2017 4:17 PM 
 
 
 
Mr. Liz Hutchinson [address] [facility] [address] [phone] Dear Christine Ding.: 
 
Please find your most recent results below. RECOMMENDATIONS: Urine culture was positive for bacterial infection.  Penicillin 500 mg PO BID for 7 days sent to pharmacy. Please call me if you have any questions: [phone] Sincerely, Chelsea Moss NP

## 2017-09-21 NOTE — LETTER
9/22/2017 12:56 PM 
 
 
 
Mr. Liz Hutchinson [address] [facility] [phone] Dear Ainsley Hickey.: 
 
Please find your most recent results below. CT of abdomen showed no acute abdominal or pelvic abnormality.  Follow-up up with GI doctor as discussed during office visit Please call me if you have any questions: [phone] Sincerely, [facility] 1

## 2017-09-21 NOTE — PROGRESS NOTES
Subjective:     Chief Complaint   Patient presents with    Abdominal Pain     Mild pain in lower abdomen, seen before, Pain rated 6/10    Urinary Frequency     Urinating frequently, memory failure    Hypertension    CHF       History of Present Illness    Keitha Fabry is a 80y.o. year old male who is a patient of Dr. Rosa Ratliff that presents today with complaints of abdominal pain. The patient has multiple medical problems. Today he is complaining of right sided abdominal pain that has been ongoing for several months. He reports the pain is unbearable today. He had a CT abdomen done in February when the symptoms initially began that was negative. TTP. Mild distension noted. Denies fever. Unsure when his last BM was. Continues to have urine urgency/frequency but no dysuria. Saw urologist but no significant findings except BPH. He is taking medicines for it. He reports unsteady gait as well. No other focal neurological symptoms. Brain MRI a few years ago show some ischemic changes. Recent head CT was negative for acute changes. His CHF has been stable. Has chronic arthritic pains. The patient lives alone. His friend reports he has had an increase in confusion. She states he has not begun taking Aricept as prescribed during his last office visit. Will reorder. Reports compliance with medications and diet. Med list and most recent labs/studies reviewed with pt. Trying to be active physically as tolerated. Reports no other new c/o. Current Outpatient Prescriptions on File Prior to Visit   Medication Sig Dispense Refill    donepezil (ARICEPT) 10 mg tablet TAKE 1 TABLET BY MOUTH EVERY NIGHT 90 Tab 3    sertraline (ZOLOFT) 25 mg tablet Take 1 Tab by mouth daily. 90 Tab 1    aspirin delayed-release 81 mg tablet Take 1 Tab by mouth daily. 30 Tab 11    fluticasone (FLONASE) 50 mcg/actuation nasal spray 2 Sprays by Both Nostrils route daily.  1 Bottle 2    tamsulosin (FLOMAX) 0.4 mg capsule TK 1 C PO QHS  3    losartan (COZAAR) 50 mg tablet Take 2 Tabs by mouth daily. 60 Tab 6    finasteride (PROSCAR) 5 mg tablet Take 1 Tab by mouth daily. 30 Tab 5     No current facility-administered medications on file prior to visit. Allergies   Allergen Reactions    Iodine Nausea and Vomiting      Past Medical History:   Diagnosis Date    Alopecia     Aortic insufficiency     Arthritis     BPH     ED (erectile dysfunction)     Essential hypertension     GERD (gastroesophageal reflux disease)     Heart murmur     Hypercholesterolemia     Hypertension     states takes no medications    NSTEMI (non-ST elevated myocardial infarction) ([facility] [address] 7/18/2016    Thrombocytopaenia     PT UNAWARE OF THIS DIAGNOSIS, CAN'T GIVE DETAILS 1/25/12      Past Surgical History:   Procedure Laterality Date    HX APPENDECTOMY      HX GI      COLONOSCOPY    TOTAL KNEE ARTHROPLASTY  1993, 2011    LEFT TOTAL KNEE    TOTAL KNEE ARTHROPLASTY  2008    RIGHT TOTAL KNEE      Social History   Substance Use Topics    Smoking status: Never Smoker    Smokeless tobacco: Never Used    Alcohol use No      Family History   Problem Relation Age of Onset    Heart Disease Mother     Cancer Maternal Grandmother      Colon cancer diagnosed at age 80.  Asthma Sister         Objective:     Vitals:    09/21/17 1341   BP: 144/75   Pulse: 67   Resp: 18   Temp: 97.8 °F (36.6 °C)   TempSrc: Oral   SpO2: 99%   Weight: 135 lb (61.2 kg)   Height: 5' 7\" (1.702 m)       Review of Systems   Constitutional: Negative. HENT: Negative. Eyes: Negative. Respiratory: Negative. Cardiovascular: Negative. Gastrointestinal: Positive for abdominal pain. Genitourinary: Positive for frequency. Musculoskeletal: Negative. Skin: Negative. Neurological: Negative. Psychiatric/Behavioral: Negative. Physical Exam   Constitutional: Vital signs are normal. He appears well-developed and well-nourished. He appears ill. No distress. HENT:   Head: Normocephalic and atraumatic. Eyes: EOM are normal. Pupils are equal, round, and reactive to light. Neck: Normal range of motion. Neck supple. Cardiovascular: Normal rate, regular rhythm and normal heart sounds. Pulmonary/Chest: Effort normal and breath sounds normal. No respiratory distress. Abdominal: Bowel sounds are normal. He exhibits distension. There is tenderness. Musculoskeletal: Normal range of motion. He exhibits no edema, tenderness or deformity. Neurological: He is alert. Skin: Skin is warm and dry. He is not diaphoretic. Psychiatric: He has a normal mood and affect. His behavior is normal.   Nursing note and vitals reviewed. Assessment/ Plan:   Diagnoses and all orders for this visit:    1. Generalized abdominal pain   Will order   -     REFERRAL FOR COLONOSCOPY  -     CT ABD PELV WO CONT; Future    2. Urinary frequency   Will order   -     CULTURE, URINE  -     AMB POC URINALYSIS DIP STICK AUTO W/O MICRO    Other orders  -     donepezil (ARICEPT) 10 mg tablet; TAKE 1 TABLET BY MOUTH EVERY NIGHT    Patient's plan of care has been reviewed with them. Patient and/or family have verbally conveyed their understanding and agreement of the patient's signs, symptoms, diagnosis, treatment and prognosis and additionally agree to follow up as recommended or return to [facility] should their condition change prior to follow-up. Discharge instructions have also been provided to the patient with some educational information regarding their diagnosis as well a list of reasons why they would want to return to the office prior to their follow-up appointment should their condition change. Follow-up with Dr. Chele Bobby as scheduled.

## 2017-09-22 ENCOUNTER — TELEPHONE (OUTPATIENT)
Dept: INTERNAL MEDICINE CLINIC | Age: 82
End: 2017-09-22

## 2017-09-22 LAB — BACTERIA UR CULT: ABNORMAL

## 2017-09-22 RX ORDER — PENICILLIN V POTASSIUM 500 MG/1
500 TABLET, FILM COATED ORAL 2 TIMES DAILY
Qty: 14 TAB | Refills: 0 | Status: SHIPPED | OUTPATIENT
Start: 2017-09-22

## 2017-09-22 NOTE — PROGRESS NOTES
BRIELLE Kennedy NP stated she contacted patient with results and recommendations, pt verbalized clear understanding. Letter mailed to patient for his records.

## 2017-09-22 NOTE — PROGRESS NOTES
CT of abdomen showed no acute abdominal or pelvic abnormality. Follow-up up with GI doctor as discussed during office visit.

## 2017-09-22 NOTE — PROGRESS NOTES
Urine culture was positive for bacterial infection. Penicillin 500 mg PO BID for 7 days sent to pharmacy.

## 2017-09-22 NOTE — TELEPHONE ENCOUNTER
Call patient to make him aware of his urine culture results.  mg PO BID for 7 days sent to his pharmacy.

## 2017-10-02 ENCOUNTER — HOSPITAL ENCOUNTER (OUTPATIENT)
Age: 82
Setting detail: OUTPATIENT SURGERY
Discharge: HOME | End: 2017-10-02
Attending: SPECIALIST | Admitting: SPECIALIST
Payer: MEDICARE

## 2017-10-02 ENCOUNTER — ANESTHESIA EVENT (OUTPATIENT)
Dept: ENDOSCOPY | Age: 82
End: 2017-10-02
Payer: MEDICARE

## 2017-10-02 ENCOUNTER — ANESTHESIA (OUTPATIENT)
Dept: ENDOSCOPY | Age: 82
End: 2017-10-02
Payer: MEDICARE

## 2017-10-02 VITALS
OXYGEN SATURATION: 97 % | HEART RATE: 46 BPM | HEIGHT: 67 IN | TEMPERATURE: 97.5 F | RESPIRATION RATE: 18 BRPM | SYSTOLIC BLOOD PRESSURE: 128 MMHG | DIASTOLIC BLOOD PRESSURE: 77 MMHG | BODY MASS INDEX: 21.19 KG/M2 | WEIGHT: 135 LBS

## 2017-10-02 LAB
ALBUMIN SERPL-MCNC: 3.2 G/DL (ref 3.5–5)
ALBUMIN/GLOB SERPL: 0.9 {RATIO} (ref 1.1–2.2)
ALP SERPL-CCNC: 77 U/L (ref 45–117)
ALT SERPL-CCNC: 45 U/L (ref 12–78)
ANION GAP SERPL CALC-SCNC: 7 MMOL/L (ref 5–15)
AST SERPL-CCNC: 35 U/L (ref 15–37)
BASOPHILS # BLD: 0 K/UL (ref 0–0.1)
BASOPHILS NFR BLD: 0 % (ref 0–1)
BILIRUB SERPL-MCNC: 0.4 MG/DL (ref 0.2–1)
BUN SERPL-MCNC: 20 MG/DL (ref 6–20)
BUN/CREAT SERPL: 14 (ref 12–20)
CALCIUM SERPL-MCNC: 9.4 MG/DL (ref 8.5–10.1)
CHLORIDE SERPL-SCNC: 110 MMOL/L (ref 97–108)
CO2 SERPL-SCNC: 24 MMOL/L (ref 21–32)
CREAT SERPL-MCNC: 1.38 MG/DL (ref 0.7–1.3)
EOSINOPHIL # BLD: 0.1 K/UL (ref 0–0.4)
EOSINOPHIL NFR BLD: 2 % (ref 0–7)
ERYTHROCYTE [DISTWIDTH] IN BLOOD BY AUTOMATED COUNT: 15.2 % (ref 11.5–14.5)
GLOBULIN SER CALC-MCNC: 3.7 G/DL (ref 2–4)
GLUCOSE SERPL-MCNC: 87 MG/DL (ref 65–100)
HCT VFR BLD AUTO: 35.7 % (ref 36.6–50.3)
HGB BLD-MCNC: 12 G/DL (ref 12.1–17)
IRON SATN MFR SERPL: 28 % (ref 20–50)
IRON SERPL-MCNC: 70 UG/DL (ref 35–150)
LYMPHOCYTES # BLD: 1.6 K/UL (ref 0.8–3.5)
LYMPHOCYTES NFR BLD: 29 % (ref 12–49)
MCH RBC QN AUTO: 29.1 PG (ref 26–34)
MCHC RBC AUTO-ENTMCNC: 33.6 G/DL (ref 30–36.5)
MCV RBC AUTO: 86.7 FL (ref 80–99)
MONOCYTES # BLD: 0.3 K/UL (ref 0–1)
MONOCYTES NFR BLD: 6 % (ref 5–13)
NEUTS SEG # BLD: 3.5 K/UL (ref 1.8–8)
NEUTS SEG NFR BLD: 63 % (ref 32–75)
PLATELET # BLD AUTO: 149 K/UL (ref 150–400)
POTASSIUM SERPL-SCNC: 4.3 MMOL/L (ref 3.5–5.1)
PROT SERPL-MCNC: 6.9 G/DL (ref 6.4–8.2)
RBC # BLD AUTO: 4.12 M/UL (ref 4.1–5.7)
SODIUM SERPL-SCNC: 141 MMOL/L (ref 136–145)
TIBC SERPL-MCNC: 248 UG/DL (ref 250–450)
TSH SERPL DL<=0.05 MIU/L-ACNC: 1.71 UIU/ML (ref 0.36–3.74)
WBC # BLD AUTO: 5.6 K/UL (ref 4.1–11.1)

## 2017-10-02 PROCEDURE — 74011250636 HC RX REV CODE- 250/636

## 2017-10-02 PROCEDURE — 80053 COMPREHEN METABOLIC PANEL: CPT | Performed by: SPECIALIST

## 2017-10-02 PROCEDURE — 85025 COMPLETE CBC W/AUTO DIFF WBC: CPT | Performed by: SPECIALIST

## 2017-10-02 PROCEDURE — 74011000250 HC RX REV CODE- 250

## 2017-10-02 PROCEDURE — 36415 COLL VENOUS BLD VENIPUNCTURE: CPT | Performed by: SPECIALIST

## 2017-10-02 PROCEDURE — 77030027957 HC TBNG IRR ENDOGTR BUSS -B: Performed by: SPECIALIST

## 2017-10-02 PROCEDURE — 84443 ASSAY THYROID STIM HORMONE: CPT | Performed by: SPECIALIST

## 2017-10-02 PROCEDURE — 76040000019: Performed by: SPECIALIST

## 2017-10-02 PROCEDURE — 83540 ASSAY OF IRON: CPT | Performed by: SPECIALIST

## 2017-10-02 PROCEDURE — 76060000031 HC ANESTHESIA FIRST 0.5 HR: Performed by: SPECIALIST

## 2017-10-02 RX ORDER — ATROPINE SULFATE 0.1 MG/ML
0.5 INJECTION INTRAVENOUS
Status: DISCONTINUED | OUTPATIENT
Start: 2017-10-02 | End: 2017-10-02 | Stop reason: HOSPADM

## 2017-10-02 RX ORDER — SODIUM CHLORIDE 9 MG/ML
50 INJECTION, SOLUTION INTRAVENOUS CONTINUOUS
Status: DISCONTINUED | OUTPATIENT
Start: 2017-10-02 | End: 2017-10-02 | Stop reason: HOSPADM

## 2017-10-02 RX ORDER — LORAZEPAM 2 MG/ML
2 INJECTION INTRAMUSCULAR AS NEEDED
Status: DISCONTINUED | OUTPATIENT
Start: 2017-10-02 | End: 2017-10-02 | Stop reason: HOSPADM

## 2017-10-02 RX ORDER — SODIUM CHLORIDE 0.9 % (FLUSH) 0.9 %
5-10 SYRINGE (ML) INJECTION EVERY 8 HOURS
Status: DISCONTINUED | OUTPATIENT
Start: 2017-10-02 | End: 2017-10-02 | Stop reason: HOSPADM

## 2017-10-02 RX ORDER — MIDAZOLAM HYDROCHLORIDE 1 MG/ML
.25-1 INJECTION, SOLUTION INTRAMUSCULAR; INTRAVENOUS
Status: DISCONTINUED | OUTPATIENT
Start: 2017-10-02 | End: 2017-10-02 | Stop reason: HOSPADM

## 2017-10-02 RX ORDER — DEXTROMETHORPHAN/PSEUDOEPHED 2.5-7.5/.8
1.2 DROPS ORAL
Status: DISCONTINUED | OUTPATIENT
Start: 2017-10-02 | End: 2017-10-02 | Stop reason: HOSPADM

## 2017-10-02 RX ORDER — FENTANYL CITRATE 50 UG/ML
200 INJECTION, SOLUTION INTRAMUSCULAR; INTRAVENOUS
Status: DISCONTINUED | OUTPATIENT
Start: 2017-10-02 | End: 2017-10-02 | Stop reason: HOSPADM

## 2017-10-02 RX ORDER — SODIUM CHLORIDE 0.9 % (FLUSH) 0.9 %
5-10 SYRINGE (ML) INJECTION AS NEEDED
Status: DISCONTINUED | OUTPATIENT
Start: 2017-10-02 | End: 2017-10-02 | Stop reason: HOSPADM

## 2017-10-02 RX ORDER — EPINEPHRINE 0.1 MG/ML
1 INJECTION INTRACARDIAC; INTRAVENOUS
Status: DISCONTINUED | OUTPATIENT
Start: 2017-10-02 | End: 2017-10-02 | Stop reason: HOSPADM

## 2017-10-02 RX ORDER — NALOXONE HYDROCHLORIDE 0.4 MG/ML
0.4 INJECTION, SOLUTION INTRAMUSCULAR; INTRAVENOUS; SUBCUTANEOUS
Status: DISCONTINUED | OUTPATIENT
Start: 2017-10-02 | End: 2017-10-02 | Stop reason: HOSPADM

## 2017-10-02 RX ORDER — SODIUM CHLORIDE 9 MG/ML
INJECTION, SOLUTION INTRAVENOUS
Status: DISCONTINUED | OUTPATIENT
Start: 2017-10-02 | End: 2017-10-02 | Stop reason: HOSPADM

## 2017-10-02 RX ORDER — PROPOFOL 10 MG/ML
INJECTION, EMULSION INTRAVENOUS AS NEEDED
Status: DISCONTINUED | OUTPATIENT
Start: 2017-10-02 | End: 2017-10-02 | Stop reason: HOSPADM

## 2017-10-02 RX ORDER — LIDOCAINE HYDROCHLORIDE 20 MG/ML
INJECTION, SOLUTION EPIDURAL; INFILTRATION; INTRACAUDAL; PERINEURAL AS NEEDED
Status: DISCONTINUED | OUTPATIENT
Start: 2017-10-02 | End: 2017-10-02 | Stop reason: HOSPADM

## 2017-10-02 RX ORDER — FLUMAZENIL 0.1 MG/ML
0.2 INJECTION INTRAVENOUS
Status: DISCONTINUED | OUTPATIENT
Start: 2017-10-02 | End: 2017-10-02 | Stop reason: HOSPADM

## 2017-10-02 RX ADMIN — PROPOFOL 20 MG: 10 INJECTION, EMULSION INTRAVENOUS at 08:18

## 2017-10-02 RX ADMIN — PROPOFOL 50 MG: 10 INJECTION, EMULSION INTRAVENOUS at 08:09

## 2017-10-02 RX ADMIN — SODIUM CHLORIDE: 9 INJECTION, SOLUTION INTRAVENOUS at 08:07

## 2017-10-02 RX ADMIN — LIDOCAINE HYDROCHLORIDE 50 MG: 20 INJECTION, SOLUTION EPIDURAL; INFILTRATION; INTRACAUDAL; PERINEURAL at 08:09

## 2017-10-02 RX ADMIN — PROPOFOL 20 MG: 10 INJECTION, EMULSION INTRAVENOUS at 08:12

## 2017-10-02 RX ADMIN — PROPOFOL 10 MG: 10 INJECTION, EMULSION INTRAVENOUS at 08:15

## 2017-10-02 RX ADMIN — PROPOFOL 20 MG: 10 INJECTION, EMULSION INTRAVENOUS at 08:23

## 2017-10-02 NOTE — ANESTHESIA POSTPROCEDURE EVALUATION
Post-Anesthesia Evaluation and Assessment    Patient: Shant Reyes Sr. MRN: [id number]  SSN: [id number]    YOB: 1935  Age: 80 y.o. Sex: male       Cardiovascular Function/Vital Signs  Visit Vitals    /77    Pulse (!) 46    Temp 36.4 °C (97.5 °F)    Resp 18    Ht 5' 7\" (1.702 m)    Wt 61.2 kg (135 lb)    SpO2 97%    BMI 21.14 kg/m2       Patient is status post MAC anesthesia for Procedure(s):  COLONOSCOPY. Nausea/Vomiting: None    Postoperative hydration reviewed and adequate. Pain:  Pain Scale 1: Numeric (0 - 10) (10/02/17 0905)  Pain Intensity 1: 0 (10/02/17 0905)   Managed    Neurological Status: At baseline    Mental Status and Level of Consciousness: Arousable    Pulmonary Status:   O2 Device: Room air (10/02/17 0905)   Adequate oxygenation and airway patent    Complications related to anesthesia: None    Post-anesthesia assessment completed.  No concerns    Signed By: Molina Soler MD     October 2, 2017

## 2017-10-02 NOTE — ANESTHESIA PREPROCEDURE EVALUATION
Anesthetic History               Review of Systems / Medical History  Patient summary reviewed, nursing notes reviewed and pertinent labs reviewed    Pulmonary                   Neuro/Psych              Cardiovascular    Hypertension      CHF: NYHA Classification III  Dysrhythmias   CAD      Comments: Cardiac LV ejection fraction 20-25%   GI/Hepatic/Renal     GERD          Comments: RUQ pain Endo/Other        Arthritis     Other Findings            Physical Exam    Airway  Mallampati: II  TM Distance: > 6 cm  Neck ROM: normal range of motion   Mouth opening: Normal     Cardiovascular    Rhythm: regular  Rate: normal         Dental  No notable dental hx       Pulmonary  Breath sounds clear to auscultation               Abdominal         Other Findings            Anesthetic Plan    ASA: 4  Anesthesia type: MAC          Induction: Intravenous  Anesthetic plan and risks discussed with: Patient

## 2017-10-02 NOTE — DISCHARGE INSTRUCTIONS
Opal Baezsancho [address]  [id number]  1935    COLON DISCHARGE INSTRUCTIONS  Discomfort:  Redness at IV site- apply warm compress to area; if redness or soreness persist- contact your physician  There may be a slight amount of blood passed from the rectum  Gaseous discomfort- walking, belching will help relieve any discomfort  You may not operate a vehicle for 12 hours  You may not engage in an occupation involving machinery or appliances for rest of today  You may not drink alcoholic beverages for at least 12 hours  Avoid making any critical decisions for at least 24 hour  DIET:   Regular diet. - however -  remember your colon is empty and a heavy meal will produce gas. Avoid these foods:  vegetables, fried / greasy foods, carbonated drinks for today. ACTIVITY:  You may resume your normal daily activities it is recommended that you spend the remainder of the day resting -  avoid any strenuous activity. CALL M.D. ANY SIGN OF:  Increasing pain, nausea, vomiting  Abdominal distension (swelling)  New increased bleeding (oral or rectal)  Fever (chills)  Pain in chest area  Bloody discharge from nose or mouth  Shortness of breath    You may  take any Advil, Aspirin, Ibuprofen, Motrin, Aleve, Goodys, or any similar pain or arthritis products or Tylenol as needed for pain. Follow-up Instructions:   Call Dr. Idalmis Huynh telephone for problems or questions [phone]      - Follow up with me.      - No need for further colonoscopy screening   - Office to call to schedule EGD and CT of abdomen and pelvis  Start famotidine

## 2017-10-02 NOTE — PERIOP NOTES

## 2017-10-02 NOTE — PROCEDURES
Colonoscopy Procedure Note    Indications:   Abdominal pain, generalized, weight loss, s/p colonoscopy in 2013 which was (-) other than small hemorrhoids  Referring Physician: Josiah Harmon DO   Anesthesia/Sedation:MAC  Endoscopist:  Dr. Kirstin Abdi  Assistant:  Endoscopy Technician-1: Tremaine Mariano  Endoscopy RN-1: Wilton Gardner RN  Endoscopy RN-2: Saba Bro    Preoperative diagnosis: RUQ PAIN    Postoperative diagnosis: Right Sided Diverticulosis. Small Internal Hemmorhoids      Procedure in Detail:  Informed consent was obtained for the procedure, including sedation. Risks of perforation, hemorrhage, adverse drug reaction, and aspiration were discussed. The patient was placed in the left lateral decubitus position. Based on the pre-procedure assessment, including review of the patient's medical history, medications, allergies, and review of systems, he had been deemed to be an appropriate candidate for moderate sedation; he was therefore sedated with the medications listed above. The patient was monitored continuously with ECG tracing, pulse oximetry, blood pressure monitoring, and direct observations. A rectal examination was performed. The [id number] was inserted into the rectum and advanced under direct vision to the terminal ileum. The quality of the colonic preparation was excellent. A careful inspection was made as the colonoscope was withdrawn, including a retroflexed view of the rectum; findings and interventions are described below. Findings:   Rectum: Grade 1 internal hemorrhoid(s); Sigmoid: normal  Descending Colon: normal  Transverse Colon: normal  Ascending Colon: normal - a few scattered diverticula  Cecum: normal  Terminal Ileum: normal proximally x 10 cm small diverticulum noted    Specimens:     none    EBL: None    Complications: None; patient tolerated the procedure well. Recommendations:     - Follow up with me.      - No need for further colonoscopy screening   - Office to call to schedule EGD and CT of abdomen and pelvis  Start famotidine    Signed By: Howard Aponte MD                        October 2, 2017

## 2017-10-02 NOTE — ANESTHESIA POSTPROCEDURE EVALUATION
Post-Anesthesia Evaluation and Assessment    Patient: Farrah Navarro Sr. MRN: [id number]  SSN: [id number]    YOB: 1935  Age: 80 y.o. Sex: male       Cardiovascular Function/Vital Signs  Visit Vitals    /55    Pulse (!) 41    Temp 36.4 °C (97.5 °F)    Resp 14    Ht 5' 7\" (1.702 m)    Wt 61.2 kg (135 lb)    SpO2 100%    BMI 21.14 kg/m2       Patient is status post MAC anesthesia for Procedure(s):  COLONOSCOPY. Nausea/Vomiting: None    Postoperative hydration reviewed and adequate. Pain:  Pain Scale 1: Numeric (0 - 10) (10/02/17 0840)  Pain Intensity 1: 0 (10/02/17 0840)   Managed    Neurological Status: At baseline    Mental Status and Level of Consciousness: Arousable    Pulmonary Status:   O2 Device: Nasal cannula (10/02/17 0825)   Adequate oxygenation and airway patent    Complications related to anesthesia: None    Post-anesthesia assessment completed.  No concerns    Signed By: Theresa Gonzalez MD     October 2, 2017

## 2017-10-02 NOTE — IP AVS SNAPSHOT
[address] 
[phone] Patient: Charlanne Bumpers Sr. MRN: [id number] FEZ:10/38/8563 You are allergic to the following Allergen Reactions Iodine Nausea and Vomiting Recent Documentation Height Weight BMI Smoking Status 1.702 m 61.2 kg 21.14 kg/m2 Never Smoker Emergency Contacts Name Discharge Info Relation Home Work Mobile Neeta Foreman(Sister)  Other Relative [6] [phone] [address] CAREGIVER [3] Daughter [21]   [phone] Alice Grace CAREGIVER [3] Friend [5]   [phone] Duane Meadows  Child [2] [phone] About your hospitalization You were admitted on:  October 2, 2017 [facility] You were discharged on:  October 2, 2017 Unit phone number:  [phone] Why you were hospitalized Your primary diagnosis was:  Not on File Providers Seen During Your Hospitalizations Provider Role Specialty Primary office phone Emmanuel Petersen MD Attending Provider Gastroenterology [phone] Your Primary Care Physician (PCP) Primary Care Physician Office Phone Office Fax Bailee Key [phone] Follow-up Information None Current Discharge Medication List  
  
CONTINUE these medications which have NOT CHANGED Dose & Instructions Dispensing Information Comments Morning Noon Evening Bedtime  
 aspirin delayed-release 81 mg tablet Your last dose was: Your next dose is:    
   
   
 Dose:  81 mg Take 1 Tab by mouth daily. Quantity:  30 Tab Refills:  11  
     
   
   
   
  
 donepezil 10 mg tablet Commonly known as:  ARICEPT Your last dose was: Your next dose is: TAKE 1 TABLET BY MOUTH EVERY NIGHT Quantity:  90 Tab Refills:  3 **Patient requests 90 days supply**  
    
   
   
   
  
 finasteride 5 mg tablet Commonly known as:  PROSCAR Your last dose was: Your next dose is:    
   
   
 Dose:  5 mg Take 1 Tab by mouth daily. Quantity:  30 Tab Refills:  5  
     
   
   
   
  
 fluticasone 50 mcg/actuation nasal spray Commonly known as:  Kylah Ba Your last dose was: Your next dose is:    
   
   
 Dose:  2 Spray 2 Sprays by Both Nostrils route daily. Quantity:  1 Bottle Refills:  2  
     
   
   
   
  
 losartan 50 mg tablet Commonly known as:  COZAAR Your last dose was: Your next dose is:    
   
   
 Dose:  100 mg Take 2 Tabs by mouth daily. Quantity:  60 Tab Refills:  6  
     
   
   
   
  
 penicillin v potassium 500 mg tablet Commonly known as:  VEETID Your last dose was: Your next dose is:    
   
   
 Dose:  500 mg Take 1 Tab by mouth two (2) times a day. Quantity:  14 Tab Refills:  0  
     
   
   
   
  
 sertraline 25 mg tablet Commonly known as:  ZOLOFT Your last dose was: Your next dose is:    
   
   
 Dose:  25 mg Take 1 Tab by mouth daily. Quantity:  90 Tab Refills:  1  
     
   
   
   
  
 tamsulosin 0.4 mg capsule Commonly known as:  FLOMAX Your last dose was: Your next dose is:    
   
   
 TK 1 C PO QHS Refills:  3 Discharge Instructions Opal Estrada 37 
[id number] 
1935 COLON DISCHARGE INSTRUCTIONS Discomfort: 
Redness at IV site- apply warm compress to area; if redness or soreness persist- contact your physician There may be a slight amount of blood passed from the rectum Gaseous discomfort- walking, belching will help relieve any discomfort You may not operate a vehicle for 12 hours You may not engage in an occupation involving machinery or appliances for rest of today You may not drink alcoholic beverages for at least 12 hours Avoid making any critical decisions for at least 24 hour DIET: 
 Regular diet.  however -  remember your colon is empty and a heavy meal will produce gas. Avoid these foods:  vegetables, fried / greasy foods, carbonated drinks for today. ACTIVITY: 
You may resume your normal daily activities it is recommended that you spend the remainder of the day resting -  avoid any strenuous activity. CALL M.D. ANY SIGN OF: Increasing pain, nausea, vomiting Abdominal distension (swelling) New increased bleeding (oral or rectal) Fever (chills) Pain in chest area Bloody discharge from nose or mouth Shortness of breath You may  take any Advil, Aspirin, Ibuprofen, Motrin, Aleve, Goodys, or any similar pain or arthritis products or Tylenol as needed for pain. Follow-up Instructions: 
 Call Dr. West Lopez Office telephone for problems or questions [phone] 
 
  - Follow up with me. - No need for further colonoscopy screening 
 - Office to call to schedule EGD and CT of abdomen and pelvis Start famotidine Discharge Orders None Introducing [facility]! Francisco J Mishra introduces [employer] patient portal. Now you can access parts of your medical record, email your doctor's office, and request medication refills online. 1. In your internet browser, go to [email or web address] 2. Click on the First Time User? Click Here link in the Sign In box. You will see the New Member Sign Up page. 3. Enter your [employer] Access Code exactly as it appears below. You will not need to use this code after youve completed the sign-up process. If you do not sign up before the expiration date, you must request a new code. · [employer] Access Code: [email or web address] Expires: 12/20/2017  2:44 PM 
 
4.  Enter the last four digits of your Social Security Number (xxxx) and Date of Birth (mm/dd/yyyy) as indicated and click Submit. You will be taken to the next sign-up page. 5. Create a [employer] ID. This will be your [employer] login ID and cannot be changed, so think of one that is secure and easy to remember. 6. Create a [employer] password. You can change your password at any time. 7. Enter your Password Reset Question and Answer. This can be used at a later time if you forget your password. 8. Enter your e-mail address. You will receive e-mail notification when new information is available in [address] 9. Click Sign Up. You can now view and download portions of your medical record. 10. Click the Download Summary menu link to download a portable copy of your medical information. If you have questions, please visit the Frequently Asked Questions section of the [employer] website. Remember, [employer] is NOT to be used for urgent needs. For medical emergencies, dial 911. Now available from your iPhone and Android! General Information Please provide this summary of care documentation to your next provider. Patient Signature:  ____________________________________________________________ Date:  ____________________________________________________________  
  
Trent Faulknerve Provider Signature:  ____________________________________________________________ Date:  ____________________________________________________________

## 2017-10-02 NOTE — ROUTINE PROCESS
Todd Maryr Sr.  1935  [id number]    Situation:  Verbal report received from: Mark Ortiz  Procedure: Procedure(s):  COLONOSCOPY    Background:    Preoperative diagnosis: RUQ PAIN  Postoperative diagnosis: Right Sided Diverticulosis. Small Internal Hemmorhoids    :  Dr. Tanya Rodney  Assistant(s): Endoscopy Technician-1: Antonio Schaeffer  Endoscopy RN-1: Magali Diehl RN  Endoscopy RN-2: Keena Malone    Specimens: * No specimens in log *  H. Pylori  no    Assessment:  Intra-procedure medications     Anesthesia gave intra-procedure sedation and medications, see anesthesia flow sheet yes    Intravenous fluids: NS@ KVO     Vital signs stable yes    Abdominal assessment: round and soft  yse      Recommendation:  Discharge patient per MD order yes .   Return to floor na  Family or Friend fam  Permission to share finding with family or friend yes

## 2017-10-02 NOTE — H&P
Pre-endoscopy H and P for Colonoscopy    The patient was seen and examined. Date of last colonoscopy: 2013, Polyps  No      The airway was assessed and documented. The problem list, past medical history, and medications were reviewed. Patient Active Problem List   Diagnosis Code    BPH (benign prostatic hypertrophy) with urinary obstruction N40.1, N13.8    DJD (degenerative joint disease) M19.90    Pure hypercholesterolemia E78.00    Mechanical complication of knee prosthesis ([facility] [address] T84.098A, Z96.659    Fibrosis of knee joint M24.669    S/P TKR (total knee replacement) Z96.659    Hypertension, essential, benign I10    LBBB (left bundle branch block) I44.7    Pes planus M21.40    Urinary urgency R39.15    Transient alteration of awareness G61.3    Systolic CHF, chronic (HCC) I50.22    Ileus (HCC) K56.7    Cardiac LV ejection fraction 30-35% R94.30    ACP (advance care planning) Z71.89    DNR (do not resuscitate) Z66    History of non-ST elevation myocardial infarction (NSTEMI) S73.4    Systolic murmur E71.8    Allergy to iodine Z88.8    Seasonal allergic rhinitis due to pollen J30.1    Memory impairment R41.3    Depression F32.9     Social History     Social History    Marital status:      Spouse name: N/A    Number of children: N/A    Years of education: N/A     Occupational History    Not on file.      Social History Main Topics    Smoking status: Never Smoker    Smokeless tobacco: Never Used    Alcohol use No    Drug use: No    Sexual activity: Not on file     Other Topics Concern    Not on file     Social History Narrative     Past Medical History:   Diagnosis Date    Alopecia     Aortic insufficiency     Arthritis     BPH     ED (erectile dysfunction)     Essential hypertension     GERD (gastroesophageal reflux disease)     Heart murmur     Hypercholesterolemia     Hypertension     states takes no medications    NSTEMI (non-ST elevated myocardial infarction) ([facility] 2016    Thrombocytopaenia     PT UNAWARE OF THIS DIAGNOSIS, CAN'T GIVE DETAILS 12     The patient has a family history of na    Prior to Admission Medications   Prescriptions Last Dose Informant Patient Reported? Taking?   aspirin delayed-release 81 mg tablet   No No   Sig: Take 1 Tab by mouth daily. donepezil (ARICEPT) 10 mg tablet   No No   Sig: TAKE 1 TABLET BY MOUTH EVERY NIGHT   finasteride (PROSCAR) 5 mg tablet   No No   Sig: Take 1 Tab by mouth daily. fluticasone (FLONASE) 50 mcg/actuation nasal spray   No No   Si Sprays by Both Nostrils route daily. losartan (COZAAR) 50 mg tablet   No No   Sig: Take 2 Tabs by mouth daily. penicillin v potassium (VEETID) 500 mg tablet   No No   Sig: Take 1 Tab by mouth two (2) times a day. sertraline (ZOLOFT) 25 mg tablet   No No   Sig: Take 1 Tab by mouth daily. tamsulosin (FLOMAX) 0.4 mg capsule   Yes No   Sig: TK 1 C PO QHS      Facility-Administered Medications: None         The review of systems is:  negative for shortness of breath or chest pain      The heart, lungs and mental status were satisfactory for the administration of MAC sedation and for the procedure. Mallampati score: See Anesthesia. I discussed with the patient the objectives, risks, consequences and alternatives to the procedure. Plan: Endoscopic procedure with MAC sedation.     Osiris Dennis MD  10/2/2017  6:49 AM

## 2017-10-06 ENCOUNTER — APPOINTMENT (OUTPATIENT)
Dept: CT IMAGING | Age: 82
End: 2017-10-06
Attending: EMERGENCY MEDICINE
Payer: MEDICARE

## 2017-10-06 ENCOUNTER — APPOINTMENT (OUTPATIENT)
Dept: GENERAL RADIOLOGY | Age: 82
End: 2017-10-06
Attending: STUDENT IN AN ORGANIZED HEALTH CARE EDUCATION/TRAINING PROGRAM
Payer: MEDICARE

## 2017-10-06 ENCOUNTER — APPOINTMENT (OUTPATIENT)
Dept: ULTRASOUND IMAGING | Age: 82
End: 2017-10-06
Attending: EMERGENCY MEDICINE
Payer: MEDICARE

## 2017-10-06 ENCOUNTER — HOSPITAL ENCOUNTER (EMERGENCY)
Age: 82
Discharge: HOME | End: 2017-10-06
Attending: EMERGENCY MEDICINE
Payer: MEDICARE

## 2017-10-06 DIAGNOSIS — R10.13 ABDOMINAL PAIN, EPIGASTRIC: Primary | ICD-10-CM

## 2017-10-06 LAB
ALBUMIN SERPL-MCNC: 3.3 G/DL (ref 3.5–5)
ALBUMIN/GLOB SERPL: 0.8 {RATIO} (ref 1.1–2.2)
ALP SERPL-CCNC: 79 U/L (ref 45–117)
ALT SERPL-CCNC: 28 U/L (ref 12–78)
ANION GAP SERPL CALC-SCNC: 9 MMOL/L (ref 5–15)
APTT PPP: 30.7 SEC (ref 22.1–32.5)
AST SERPL-CCNC: 16 U/L (ref 15–37)
BASOPHILS # BLD: 0 K/UL (ref 0–0.1)
BASOPHILS NFR BLD: 0 % (ref 0–1)
BILIRUB SERPL-MCNC: 0.5 MG/DL (ref 0.2–1)
BUN SERPL-MCNC: 18 MG/DL (ref 6–20)
BUN/CREAT SERPL: 12 (ref 12–20)
CALCIUM SERPL-MCNC: 8.8 MG/DL (ref 8.5–10.1)
CHLORIDE SERPL-SCNC: 108 MMOL/L (ref 97–108)
CK SERPL-CCNC: 42 U/L (ref 39–308)
CO2 SERPL-SCNC: 23 MMOL/L (ref 21–32)
CREAT SERPL-MCNC: 1.46 MG/DL (ref 0.7–1.3)
D DIMER PPP FEU-MCNC: 1.67 MG/L FEU (ref 0–0.65)
DIFFERENTIAL METHOD BLD: ABNORMAL
EOSINOPHIL # BLD: 0 K/UL (ref 0–0.4)
EOSINOPHIL NFR BLD: 0 % (ref 0–7)
ERYTHROCYTE [DISTWIDTH] IN BLOOD BY AUTOMATED COUNT: 15 % (ref 11.5–14.5)
GLOBULIN SER CALC-MCNC: 3.9 G/DL (ref 2–4)
GLUCOSE SERPL-MCNC: 113 MG/DL (ref 65–100)
HCT VFR BLD AUTO: 37.2 % (ref 36.6–50.3)
HGB BLD-MCNC: 12.5 G/DL (ref 12.1–17)
INR PPP: 1.1 (ref 0.9–1.1)
LIPASE SERPL-CCNC: 80 U/L (ref 73–393)
LYMPHOCYTES # BLD: 0.7 K/UL (ref 0.8–3.5)
LYMPHOCYTES NFR BLD: 12 % (ref 12–49)
MAGNESIUM SERPL-MCNC: 1.8 MG/DL (ref 1.6–2.4)
MCH RBC QN AUTO: 28.9 PG (ref 26–34)
MCHC RBC AUTO-ENTMCNC: 33.6 G/DL (ref 30–36.5)
MCV RBC AUTO: 86.1 FL (ref 80–99)
MONOCYTES # BLD: 0.3 K/UL (ref 0–1)
MONOCYTES NFR BLD: 5 % (ref 5–13)
NEUTS SEG # BLD: 5.1 K/UL (ref 1.8–8)
NEUTS SEG NFR BLD: 83 % (ref 32–75)
PLATELET # BLD AUTO: 157 K/UL (ref 150–400)
PLATELET COMMENTS,PCOM: ABNORMAL
POTASSIUM SERPL-SCNC: 4.5 MMOL/L (ref 3.5–5.1)
PROT SERPL-MCNC: 7.2 G/DL (ref 6.4–8.2)
PROTHROMBIN TIME: 11.3 SEC (ref 9–11.1)
RBC # BLD AUTO: 4.32 M/UL (ref 4.1–5.7)
RBC MORPH BLD: ABNORMAL
RBC MORPH BLD: ABNORMAL
SODIUM SERPL-SCNC: 140 MMOL/L (ref 136–145)
THERAPEUTIC RANGE,PTTT: NORMAL SECS (ref 58–77)
TROPONIN I BLD-MCNC: <0.04 NG/ML (ref 0–0.08)
TROPONIN I SERPL-MCNC: 0.04 NG/ML
TROPONIN I SERPL-MCNC: <0.04 NG/ML
WBC # BLD AUTO: 6.1 K/UL (ref 4.1–11.1)

## 2017-10-06 PROCEDURE — 84484 ASSAY OF TROPONIN QUANT: CPT | Performed by: STUDENT IN AN ORGANIZED HEALTH CARE EDUCATION/TRAINING PROGRAM

## 2017-10-06 PROCEDURE — 74011250636 HC RX REV CODE- 250/636: Performed by: EMERGENCY MEDICINE

## 2017-10-06 PROCEDURE — 80053 COMPREHEN METABOLIC PANEL: CPT | Performed by: STUDENT IN AN ORGANIZED HEALTH CARE EDUCATION/TRAINING PROGRAM

## 2017-10-06 PROCEDURE — 76705 ECHO EXAM OF ABDOMEN: CPT

## 2017-10-06 PROCEDURE — 85025 COMPLETE CBC W/AUTO DIFF WBC: CPT | Performed by: STUDENT IN AN ORGANIZED HEALTH CARE EDUCATION/TRAINING PROGRAM

## 2017-10-06 PROCEDURE — 85610 PROTHROMBIN TIME: CPT | Performed by: STUDENT IN AN ORGANIZED HEALTH CARE EDUCATION/TRAINING PROGRAM

## 2017-10-06 PROCEDURE — 71010 XR CHEST PORT: CPT

## 2017-10-06 PROCEDURE — 74011000258 HC RX REV CODE- 258: Performed by: EMERGENCY MEDICINE

## 2017-10-06 PROCEDURE — 74011636320 HC RX REV CODE- 636/320: Performed by: EMERGENCY MEDICINE

## 2017-10-06 PROCEDURE — 93005 ELECTROCARDIOGRAM TRACING: CPT

## 2017-10-06 PROCEDURE — 83735 ASSAY OF MAGNESIUM: CPT | Performed by: EMERGENCY MEDICINE

## 2017-10-06 PROCEDURE — 36415 COLL VENOUS BLD VENIPUNCTURE: CPT | Performed by: EMERGENCY MEDICINE

## 2017-10-06 PROCEDURE — 71275 CT ANGIOGRAPHY CHEST: CPT

## 2017-10-06 PROCEDURE — 85379 FIBRIN DEGRADATION QUANT: CPT | Performed by: EMERGENCY MEDICINE

## 2017-10-06 PROCEDURE — 83690 ASSAY OF LIPASE: CPT | Performed by: EMERGENCY MEDICINE

## 2017-10-06 PROCEDURE — 82550 ASSAY OF CK (CPK): CPT | Performed by: STUDENT IN AN ORGANIZED HEALTH CARE EDUCATION/TRAINING PROGRAM

## 2017-10-06 PROCEDURE — 99285 EMERGENCY DEPT VISIT HI MDM: CPT

## 2017-10-06 PROCEDURE — 85730 THROMBOPLASTIN TIME PARTIAL: CPT | Performed by: STUDENT IN AN ORGANIZED HEALTH CARE EDUCATION/TRAINING PROGRAM

## 2017-10-06 RX ORDER — MORPHINE SULFATE 2 MG/ML
2 INJECTION, SOLUTION INTRAMUSCULAR; INTRAVENOUS
Status: DISCONTINUED | OUTPATIENT
Start: 2017-10-06 | End: 2017-10-07 | Stop reason: HOSPADM

## 2017-10-06 RX ORDER — MAGNESIUM SULFATE HEPTAHYDRATE 40 MG/ML
2 INJECTION, SOLUTION INTRAVENOUS
Status: DISCONTINUED | OUTPATIENT
Start: 2017-10-06 | End: 2017-10-06

## 2017-10-06 RX ORDER — SODIUM CHLORIDE 0.9 % (FLUSH) 0.9 %
10 SYRINGE (ML) INJECTION
Status: COMPLETED | OUTPATIENT
Start: 2017-10-06 | End: 2017-10-06

## 2017-10-06 RX ADMIN — IOPAMIDOL 60 ML: 755 INJECTION, SOLUTION INTRAVENOUS at 21:33

## 2017-10-06 RX ADMIN — SODIUM CHLORIDE 1000 ML: 900 INJECTION, SOLUTION INTRAVENOUS at 20:40

## 2017-10-06 RX ADMIN — SODIUM CHLORIDE 100 ML: 900 INJECTION, SOLUTION INTRAVENOUS at 21:33

## 2017-10-06 RX ADMIN — Medication 10 ML: at 21:33

## 2017-10-07 VITALS
TEMPERATURE: 97.4 F | HEIGHT: 67 IN | RESPIRATION RATE: 18 BRPM | DIASTOLIC BLOOD PRESSURE: 87 MMHG | BODY MASS INDEX: 21.35 KG/M2 | WEIGHT: 136 LBS | HEART RATE: 57 BPM | SYSTOLIC BLOOD PRESSURE: 122 MMHG | OXYGEN SATURATION: 100 %

## 2017-10-07 LAB
ATRIAL RATE: 53 BPM
CALCULATED P AXIS, ECG09: -11 DEGREES
CALCULATED R AXIS, ECG10: 43 DEGREES
CALCULATED T AXIS, ECG11: -120 DEGREES
DIAGNOSIS, 93000: NORMAL
P-R INTERVAL, ECG05: 174 MS
Q-T INTERVAL, ECG07: 556 MS
QRS DURATION, ECG06: 166 MS
QTC CALCULATION (BEZET), ECG08: 521 MS
VENTRICULAR RATE, ECG03: 53 BPM

## 2017-10-07 NOTE — DISCHARGE INSTRUCTIONS

## 2017-10-07 NOTE — ED NOTES
Bedside and Verbal shift change report given to Dorian Mcbride (oncoming nurse) by Franklin Aleman (offgoing nurse). Report included the following information SBAR, ED Summary, MAR and Recent Results.

## 2017-10-07 NOTE — ED PROVIDER NOTES
HPI Comments: 80 y.o. male with past medical history significant for thrombocytopenia, hypercholesterolemia, BPH, heart murmur, alopecia, ED, aortic insufficiency, GERD, arthritis, HTN, and NSTEMI who presents to the ED with chief complaint of SOB. Patient reports intermittent abdominal pain with onset ~2 months ago. Patient reports SOB with onset ~1 month ago. Patient denies being on oxygen therapy at home. Patient denies a history of asthma and COPD. Patient denies back pain, diarrhea, dysuria, and hematuria. There are no other acute medical concerns at this time. PCP: Tre James DO    Note written by Beryl Goodpasture, Scribe, as dictated by Zach Mccoy MD 6:50 PM     The history is provided by the patient. Past Medical History:   Diagnosis Date    Alopecia     Aortic insufficiency     Arthritis     BPH     ED (erectile dysfunction)     Essential hypertension     GERD (gastroesophageal reflux disease)     Heart murmur     Hypercholesterolemia     Hypertension     states takes no medications    NSTEMI (non-ST elevated myocardial infarction) ([facility] [address] 7/18/2016    Thrombocytopaenia     PT UNAWARE OF THIS DIAGNOSIS, CAN'T GIVE DETAILS 1/25/12       Past Surgical History:   Procedure Laterality Date    COLONOSCOPY Left 10/2/2017    COLONOSCOPY performed by Silvana Gracia MD at [address] HX APPENDECTOMY      HX GI      COLONOSCOPY    TOTAL KNEE ARTHROPLASTY  1993, 2011    LEFT TOTAL KNEE    TOTAL KNEE ARTHROPLASTY  2008    RIGHT TOTAL KNEE         Family History:   Problem Relation Age of Onset    Heart Disease Mother     Cancer Maternal Grandmother      Colon cancer diagnosed at age 80.  Asthma Sister        Social History     Social History    Marital status:      Spouse name: N/A    Number of children: N/A    Years of education: N/A     Occupational History    Not on file.      Social History Main Topics    Smoking status: Never Smoker    Smokeless tobacco: Never Used    Alcohol use No    Drug use: No    Sexual activity: Not on file     Other Topics Concern    Not on file     Social History Narrative         ALLERGIES: Iodine    Review of Systems   Constitutional: Negative for activity change, chills and fever. HENT: Negative for nosebleeds, sore throat, trouble swallowing and voice change. Eyes: Negative for visual disturbance. Respiratory: Positive for shortness of breath. Cardiovascular: Negative for chest pain and palpitations. Gastrointestinal: Positive for abdominal pain. Negative for constipation, diarrhea and nausea. Genitourinary: Negative for difficulty urinating, dysuria, hematuria and urgency. Musculoskeletal: Negative for back pain, neck pain and neck stiffness. Skin: Negative for color change. Allergic/Immunologic: Negative for immunocompromised state. Neurological: Negative for dizziness, seizures, syncope, weakness, light-headedness, numbness and headaches. Psychiatric/Behavioral: Negative for behavioral problems, confusion, hallucinations, self-injury and suicidal ideas. All other systems reviewed and are negative. Vitals:    10/06/17 1739 10/06/17 1915 10/06/17 2000   BP:  96/47 (!) 162/101   Pulse: (!) 44 (!) 51 (!) 54   Resp: 18 12 28   Temp: 97.3 °F (36.3 °C)     SpO2: 98% 100% 96%   Weight: 61.7 kg (136 lb)     Height: 5' 7\" (1.702 m)              Physical Exam   Constitutional: He is oriented to person, place, and time. He appears well-developed and well-nourished. No distress. HENT:   Head: Normocephalic and atraumatic. Eyes: Pupils are equal, round, and reactive to light. Neck: Normal range of motion. Neck supple. Cardiovascular: Normal rate, regular rhythm and normal heart sounds. Exam reveals no gallop and no friction rub. No murmur heard. Pulmonary/Chest: No respiratory distress. He has no wheezes. Mild dyspnea. Abdominal: Soft. Bowel sounds are normal. He exhibits no distension. There is tenderness. There is no rebound and no guarding. Epigastric and RUQ tenderness to palpation. Musculoskeletal: Normal range of motion. He exhibits no edema. No pedal edema. Neurological: He is alert and oriented to person, place, and time. Skin: Skin is warm. No rash noted. He is not diaphoretic. Psychiatric: He has a normal mood and affect. His behavior is normal. Judgment and thought content normal.   Nursing note and vitals reviewed. Note written by Faith Joya, as dictated by Will Alston MD 6:57 PM      [facility]  ED Course   This is an 80-year-old male, with past medical history, review of systems, physical exam as above, presenting with dyspnea and epigastric pain. Patient states the pain is been ongoing for several months, worse today. He denies chest pain, nausea, vomiting, syncope, diaphoresis. He states he is eating and drinking normally, no complaints of dysuria, diarrhea or constipation. Physical exam arrival for elderly appearing individual, mildly dyspneic however with clear breath sounds, epigastric tenderness a rectal quadrant tenderness to palpation, without rebound or guarding. Unclear as to the patient's source of dyspnea, will obtain CMP, CBC, panel ultrasound, chest x-ray, cardiac enzymes, EKG, and make a disposition based the patient's diagnostics response to therapy. Procedures    ED EKG interpretation:  Rhythm: sinus bradycardia; and regular . Rate (approx.): 53 bpm; Axis: normal; ST/T wave: elevated ST in V3 and V4 with inverted T-wave in V5 and V6; Y-complex QRS; wide QTC; prolonged QT; LBBB; similar to EKG from February 2017. Note written by Faith Joya, as dictated by Will Alston MD 9:02 PM    11:55 PM  Patient with pain improved, serial EKGs negative, EKG unchanged from last recorded EKG, double ultrasound, and chest CT unremarkable.  Unclear as to why patient is having ongoing pain, he reports incomplete compliance with his medications. In the setting of no acute life-threatening illness apparent on imaging, or left lower, or EKG, we'll disposition him home to followup with his regular physician.

## 2017-10-16 ENCOUNTER — HOSPITAL ENCOUNTER (OUTPATIENT)
Age: 82
Setting detail: OUTPATIENT SURGERY
Discharge: HOME | End: 2017-10-16
Attending: SPECIALIST | Admitting: SPECIALIST
Payer: MEDICARE

## 2017-10-16 ENCOUNTER — ANESTHESIA EVENT (OUTPATIENT)
Dept: ENDOSCOPY | Age: 82
End: 2017-10-16
Payer: MEDICARE

## 2017-10-16 ENCOUNTER — ANESTHESIA (OUTPATIENT)
Dept: ENDOSCOPY | Age: 82
End: 2017-10-16
Payer: MEDICARE

## 2017-10-16 VITALS
SYSTOLIC BLOOD PRESSURE: 119 MMHG | HEIGHT: 67 IN | DIASTOLIC BLOOD PRESSURE: 83 MMHG | WEIGHT: 134 LBS | RESPIRATION RATE: 13 BRPM | OXYGEN SATURATION: 100 % | HEART RATE: 54 BPM | BODY MASS INDEX: 21.03 KG/M2

## 2017-10-16 LAB
ALBUMIN SERPL-MCNC: 3.4 G/DL (ref 3.5–5)
ALBUMIN/GLOB SERPL: 0.9 {RATIO} (ref 1.1–2.2)
ALP SERPL-CCNC: 74 U/L (ref 45–117)
ALT SERPL-CCNC: 21 U/L (ref 12–78)
ANION GAP SERPL CALC-SCNC: 6 MMOL/L (ref 5–15)
AST SERPL-CCNC: 12 U/L (ref 15–37)
BASOPHILS # BLD: 0 K/UL (ref 0–0.1)
BASOPHILS NFR BLD: 0 % (ref 0–1)
BILIRUB SERPL-MCNC: 0.6 MG/DL (ref 0.2–1)
BUN SERPL-MCNC: 23 MG/DL (ref 6–20)
BUN/CREAT SERPL: 17 (ref 12–20)
CALCIUM SERPL-MCNC: 9.4 MG/DL (ref 8.5–10.1)
CHLORIDE SERPL-SCNC: 108 MMOL/L (ref 97–108)
CO2 SERPL-SCNC: 28 MMOL/L (ref 21–32)
CREAT SERPL-MCNC: 1.38 MG/DL (ref 0.7–1.3)
CRP SERPL-MCNC: <0.29 MG/DL (ref 0–0.6)
EOSINOPHIL # BLD: 0.1 K/UL (ref 0–0.4)
EOSINOPHIL NFR BLD: 2 % (ref 0–7)
ERYTHROCYTE [DISTWIDTH] IN BLOOD BY AUTOMATED COUNT: 15.6 % (ref 11.5–14.5)
FOLATE SERPL-MCNC: 17.8 NG/ML (ref 5–21)
GLOBULIN SER CALC-MCNC: 3.7 G/DL (ref 2–4)
GLUCOSE SERPL-MCNC: 90 MG/DL (ref 65–100)
H PYLORI FROM TISSUE: NEGATIVE
H PYLORI FROM TISSUE: POSITIVE
HCT VFR BLD AUTO: 38.4 % (ref 36.6–50.3)
HGB BLD-MCNC: 12.7 G/DL (ref 12.1–17)
KIT LOT NO., HCLOLOT: ABNORMAL
KIT LOT NO., HCLOLOT: ABNORMAL
LYMPHOCYTES # BLD: 1.1 K/UL (ref 0.8–3.5)
LYMPHOCYTES NFR BLD: 22 % (ref 12–49)
MCH RBC QN AUTO: 28.9 PG (ref 26–34)
MCHC RBC AUTO-ENTMCNC: 33.1 G/DL (ref 30–36.5)
MCV RBC AUTO: 87.3 FL (ref 80–99)
MONOCYTES # BLD: 0.3 K/UL (ref 0–1)
MONOCYTES NFR BLD: 6 % (ref 5–13)
NEGATIVE CONTROL: NEGATIVE
NEGATIVE CONTROL: NEGATIVE
NEUTS SEG # BLD: 3.3 K/UL (ref 1.8–8)
NEUTS SEG NFR BLD: 70 % (ref 32–75)
PLATELET # BLD AUTO: 135 K/UL (ref 150–400)
POSITIVE CONTROL: POSITIVE
POSITIVE CONTROL: POSITIVE
POTASSIUM SERPL-SCNC: 4.7 MMOL/L (ref 3.5–5.1)
PROT SERPL-MCNC: 7.1 G/DL (ref 6.4–8.2)
RBC # BLD AUTO: 4.4 M/UL (ref 4.1–5.7)
SODIUM SERPL-SCNC: 142 MMOL/L (ref 136–145)
VIT B12 SERPL-MCNC: 467 PG/ML (ref 211–911)
WBC # BLD AUTO: 4.8 K/UL (ref 4.1–11.1)

## 2017-10-16 PROCEDURE — 76060000031 HC ANESTHESIA FIRST 0.5 HR: Performed by: SPECIALIST

## 2017-10-16 PROCEDURE — 82746 ASSAY OF FOLIC ACID SERUM: CPT | Performed by: SPECIALIST

## 2017-10-16 PROCEDURE — 88305 TISSUE EXAM BY PATHOLOGIST: CPT | Performed by: SPECIALIST

## 2017-10-16 PROCEDURE — 82784 ASSAY IGA/IGD/IGG/IGM EACH: CPT | Performed by: SPECIALIST

## 2017-10-16 PROCEDURE — 85025 COMPLETE CBC W/AUTO DIFF WBC: CPT | Performed by: SPECIALIST

## 2017-10-16 PROCEDURE — 36415 COLL VENOUS BLD VENIPUNCTURE: CPT | Performed by: SPECIALIST

## 2017-10-16 PROCEDURE — 74011000250 HC RX REV CODE- 250

## 2017-10-16 PROCEDURE — 88342 IMHCHEM/IMCYTCHM 1ST ANTB: CPT | Performed by: SPECIALIST

## 2017-10-16 PROCEDURE — 76040000019: Performed by: SPECIALIST

## 2017-10-16 PROCEDURE — 82607 VITAMIN B-12: CPT | Performed by: SPECIALIST

## 2017-10-16 PROCEDURE — 86140 C-REACTIVE PROTEIN: CPT | Performed by: SPECIALIST

## 2017-10-16 PROCEDURE — 87077 CULTURE AEROBIC IDENTIFY: CPT | Performed by: SPECIALIST

## 2017-10-16 PROCEDURE — 80053 COMPREHEN METABOLIC PANEL: CPT | Performed by: SPECIALIST

## 2017-10-16 PROCEDURE — 77030009426 HC FCPS BIOP ENDOSC BSC -B: Performed by: SPECIALIST

## 2017-10-16 RX ORDER — LIDOCAINE HYDROCHLORIDE 20 MG/ML
INJECTION, SOLUTION EPIDURAL; INFILTRATION; INTRACAUDAL; PERINEURAL AS NEEDED
Status: DISCONTINUED | OUTPATIENT
Start: 2017-10-16 | End: 2017-10-16 | Stop reason: HOSPADM

## 2017-10-16 RX ORDER — FENTANYL CITRATE 50 UG/ML
200 INJECTION, SOLUTION INTRAMUSCULAR; INTRAVENOUS
Status: ACTIVE | OUTPATIENT
Start: 2017-10-16 | End: 2017-10-16

## 2017-10-16 RX ORDER — FLUMAZENIL 0.1 MG/ML
0.2 INJECTION INTRAVENOUS
Status: ACTIVE | OUTPATIENT
Start: 2017-10-16 | End: 2017-10-16

## 2017-10-16 RX ORDER — DEXTROMETHORPHAN/PSEUDOEPHED 2.5-7.5/.8
1.2 DROPS ORAL
Status: DISCONTINUED | OUTPATIENT
Start: 2017-10-16 | End: 2017-10-17 | Stop reason: HOSPADM

## 2017-10-16 RX ORDER — ATROPINE SULFATE 0.1 MG/ML
0.5 INJECTION INTRAVENOUS
Status: ACTIVE | OUTPATIENT
Start: 2017-10-16 | End: 2017-10-16

## 2017-10-16 RX ORDER — SODIUM CHLORIDE 0.9 % (FLUSH) 0.9 %
5-10 SYRINGE (ML) INJECTION EVERY 8 HOURS
Status: ACTIVE | OUTPATIENT
Start: 2017-10-16 | End: 2017-10-16

## 2017-10-16 RX ORDER — MIDAZOLAM HYDROCHLORIDE 1 MG/ML
.25-1 INJECTION, SOLUTION INTRAMUSCULAR; INTRAVENOUS
Status: ACTIVE | OUTPATIENT
Start: 2017-10-16 | End: 2017-10-16

## 2017-10-16 RX ORDER — LORAZEPAM 2 MG/ML
2 INJECTION INTRAMUSCULAR AS NEEDED
Status: ACTIVE | OUTPATIENT
Start: 2017-10-16 | End: 2017-10-16

## 2017-10-16 RX ORDER — SODIUM CHLORIDE 9 MG/ML
50 INJECTION, SOLUTION INTRAVENOUS CONTINUOUS
Status: DISPENSED | OUTPATIENT
Start: 2017-10-16 | End: 2017-10-16

## 2017-10-16 RX ORDER — NALOXONE HYDROCHLORIDE 0.4 MG/ML
0.4 INJECTION, SOLUTION INTRAMUSCULAR; INTRAVENOUS; SUBCUTANEOUS
Status: ACTIVE | OUTPATIENT
Start: 2017-10-16 | End: 2017-10-16

## 2017-10-16 RX ORDER — EPINEPHRINE 0.1 MG/ML
1 INJECTION INTRACARDIAC; INTRAVENOUS
Status: ACTIVE | OUTPATIENT
Start: 2017-10-16 | End: 2017-10-16

## 2017-10-16 RX ORDER — SODIUM CHLORIDE 0.9 % (FLUSH) 0.9 %
5-10 SYRINGE (ML) INJECTION AS NEEDED
Status: ACTIVE | OUTPATIENT
Start: 2017-10-16 | End: 2017-10-16

## 2017-10-16 RX ORDER — SODIUM CHLORIDE 9 MG/ML
INJECTION, SOLUTION INTRAVENOUS
Status: DISCONTINUED | OUTPATIENT
Start: 2017-10-16 | End: 2017-10-16 | Stop reason: HOSPADM

## 2017-10-16 RX ORDER — PROPOFOL 10 MG/ML
INJECTION, EMULSION INTRAVENOUS AS NEEDED
Status: DISCONTINUED | OUTPATIENT
Start: 2017-10-16 | End: 2017-10-16 | Stop reason: HOSPADM

## 2017-10-16 RX ADMIN — PROPOFOL 20 MG: 10 INJECTION, EMULSION INTRAVENOUS at 14:31

## 2017-10-16 RX ADMIN — PROPOFOL 20 MG: 10 INJECTION, EMULSION INTRAVENOUS at 14:28

## 2017-10-16 RX ADMIN — SODIUM CHLORIDE: 9 INJECTION, SOLUTION INTRAVENOUS at 14:05

## 2017-10-16 RX ADMIN — PROPOFOL 40 MG: 10 INJECTION, EMULSION INTRAVENOUS at 14:23

## 2017-10-16 RX ADMIN — PROPOFOL 40 MG: 10 INJECTION, EMULSION INTRAVENOUS at 14:21

## 2017-10-16 RX ADMIN — LIDOCAINE HYDROCHLORIDE 50 MG: 20 INJECTION, SOLUTION EPIDURAL; INFILTRATION; INTRACAUDAL; PERINEURAL at 14:21

## 2017-10-16 RX ADMIN — PROPOFOL 20 MG: 10 INJECTION, EMULSION INTRAVENOUS at 14:25

## 2017-10-16 NOTE — H&P
Pre-endoscopy H and P    The patient was seen and examined. The airway was assessed and documented. The problem list, past medical history, and medications were reviewed. 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    S/P TKR (total knee replacement) Z96.659    Hypertension, essential, benign I10    LBBB (left bundle branch block) I44.7    Pes planus M21.40    Urinary urgency R39.15    Transient alteration of awareness Z51.8    Systolic CHF, chronic (HCC) I50.22    Ileus (HCC) K56.7    Cardiac LV ejection fraction 30-35% R94.30    ACP (advance care planning) Z71.89    DNR (do not resuscitate) Z66    History of non-ST elevation myocardial infarction (NSTEMI) D92.6    Systolic murmur J21.6    Allergy to iodine Z88.8    Seasonal allergic rhinitis due to pollen J30.1    Memory impairment R41.3    Depression F32.9     Social History     Social History    Marital status:      Spouse name: N/A    Number of children: N/A    Years of education: N/A     Occupational History    Not on file.      Social History Main Topics    Smoking status: Never Smoker    Smokeless tobacco: Never Used    Alcohol use No    Drug use: No    Sexual activity: Not on file     Other Topics Concern    Not on file     Social History Narrative     Past Medical History:   Diagnosis Date    Alopecia     Aortic insufficiency     Arthritis     BPH     ED (erectile dysfunction)     Essential hypertension     GERD (gastroesophageal reflux disease)     Heart murmur     Hypercholesterolemia     Hypertension     states takes no medications    [facility]    Thrombocytopaenia     PT UNAWARE OF THIS DIAGNOSIS, CAN'T GIVE DETAILS 12     The patient has a family history of na    Prior to Admission Medications   Prescriptions Last Dose Informant Patient Reported? Taking?   aspirin delayed-release 81 mg tablet 10/9/2017 at Unknown time  No Yes   Sig: Take 1 Tab by mouth daily. donepezil (ARICEPT) 10 mg tablet 10/14/2017 at Unknown time  No No   Sig: TAKE 1 TABLET BY MOUTH EVERY NIGHT   finasteride (PROSCAR) 5 mg tablet 10/14/2017  No No   Sig: Take 1 Tab by mouth daily. fluticasone (FLONASE) 50 mcg/actuation nasal spray Unknown at Unknown time  No No   Si Sprays by Both Nostrils route daily. losartan (COZAAR) 50 mg tablet 10/15/2017 at Unknown time  No Yes   Sig: Take 2 Tabs by mouth daily. penicillin v potassium (VEETID) 500 mg tablet 10/15/2017 at Unknown time  No Yes   Sig: Take 1 Tab by mouth two (2) times a day. sertraline (ZOLOFT) 25 mg tablet 10/15/2017 at Unknown time  No Yes   Sig: Take 1 Tab by mouth daily. tamsulosin (FLOMAX) 0.4 mg capsule 10/15/2017 at Unknown time  Yes Yes   Sig: TK 1 C PO QHS      Facility-Administered Medications: None         The review of systems is:  negative for shortness of breath or chest pain      The heart, lungs and mental status were satisfactory for the administration of MAC sedation and for the procedure. Mallampati score: See Anesthesia. I discussed with the patient the objectives, risks, consequences and alternatives to the procedure. Plan: Endoscopic procedure with MAC sedation.     Yevgeniy Collazo MD  10/16/2017  2:11 PM

## 2017-10-16 NOTE — ANESTHESIA POSTPROCEDURE EVALUATION
Post-Anesthesia Evaluation and Assessment    Patient: Adry Crane Sr. MRN: [id number]  SSN: [id number]    YOB: 1935  Age: 80 y.o. Sex: male       Cardiovascular Function/Vital Signs  Visit Vitals    /83    Pulse (!) 54    Resp 13    Ht 5' 7\" (1.702 m)    Wt 60.8 kg (134 lb)    SpO2 100%    BMI 20.99 kg/m2       Patient is status post MAC anesthesia for Procedure(s):  ESOPHAGOGASTRODUODENOSCOPY (EGD)  ESOPHAGOGASTRODUODENAL (EGD) BIOPSY. Nausea/Vomiting: None    Postoperative hydration reviewed and adequate. Pain:  Pain Scale 1: Numeric (0 - 10) (10/16/17 1330)  Pain Intensity 1: 0 (10/16/17 1330)   Managed    Neurological Status: At baseline    Mental Status and Level of Consciousness: Arousable    Pulmonary Status:   O2 Device: Nasal cannula (10/16/17 1436)   Adequate oxygenation and airway patent    Complications related to anesthesia: None    Post-anesthesia assessment completed.  No concerns    Signed By: Debbie Riggs MD     October 16, 2017

## 2017-10-16 NOTE — IP AVS SNAPSHOT
[address] 
[phone] Patient: Adry Crane Sr. MRN: [id number] FEN:83/89/3374 Current Discharge Medication List  
  
CONTINUE these medications which have NOT CHANGED Dose & Instructions Dispensing Information Comments Morning Noon Evening Bedtime  
 aspirin delayed-release 81 mg tablet Your last dose was: Your next dose is:    
   
   
 Dose:  81 mg Take 1 Tab by mouth daily. Quantity:  30 Tab Refills:  11  
     
   
   
   
  
 donepezil 10 mg tablet Commonly known as:  ARICEPT Your last dose was: Your next dose is: TAKE 1 TABLET BY MOUTH EVERY NIGHT Quantity:  90 Tab Refills:  3  
 **Patient requests 90 days supply**  
    
   
   
   
  
 finasteride 5 mg tablet Commonly known as:  PROSCAR Your last dose was: Your next dose is:    
   
   
 Dose:  5 mg Take 1 Tab by mouth daily. Quantity:  30 Tab Refills:  5  
     
   
   
   
  
 fluticasone 50 mcg/actuation nasal spray Commonly known as:  Lisa Lena Your last dose was: Your next dose is:    
   
   
 Dose:  2 Spray 2 Sprays by Both Nostrils route daily. Quantity:  1 Bottle Refills:  2  
     
   
   
   
  
 losartan 50 mg tablet Commonly known as:  COZAAR Your last dose was: Your next dose is:    
   
   
 Dose:  100 mg Take 2 Tabs by mouth daily. Quantity:  60 Tab Refills:  6  
     
   
   
   
  
 penicillin v potassium 500 mg tablet Commonly known as:  VEETID Your last dose was: Your next dose is:    
   
   
 Dose:  500 mg Take 1 Tab by mouth two (2) times a day. Quantity:  14 Tab Refills:  0  
     
   
   
   
  
 sertraline 25 mg tablet Commonly known as:  ZOLOFT Your last dose was: Your next dose is:    
   
   
 Dose:  25 mg Take 1 Tab by mouth daily. Quantity:  90 Tab Refills:  1 tamsulosin 0.4 mg capsule Commonly known as:  FLOMAX Your last dose was: Your next dose is:    
   
   
 TK 1 C PO QHS Refills:  3

## 2017-10-16 NOTE — PROCEDURES
EGD Procedure Note    Indications:  weight loss, abdominal pain   Referring Physician: Josiah Harmon DO   Anesthesia/Sedation:MAC  Endoscopist:  Dr. Kirstin Abdi  Assistant:  Endoscopy Technician-1: Will Silva  Endoscopy RN-1: Jeb Arzola RN    Preoperative diagnosis: LOSS OF WEIGHT, GENERALIZED ABDOMINAL PAIN, ANEMIA, UNSPECIFIED, ABDOMINAL TENDERNESS, LEFT LOWER QUADRANT    Postoperative diagnosis: antral gastritis      Procedure in Detail:  Informed consent was obtained for the procedure, including sedation. Risks of perforation, hemorrhage, adverse drug reaction, and aspiration were discussed. The patient was placed in the left lateral decubitus position. Based on the pre-procedure assessment, including review of the patient's medical history, medications, allergies, and review of systems, he had been deemed to be an appropriate candidate for moderate sedation; he was therefore sedated with the medications listed above. The patient was monitored continuously with ECG tracing, pulse oximetry, blood pressure monitoring, and direct observations. An [employer] video endoscope was inserted into the mouth and advanced under direct vision to into the esophagus, then stomach and duodenum. A careful inspection was made as the gastroscope was withdrawn, including a retroflexed view of the proximal stomach; findings and interventions are described below. Findings:   Esophagus:normal  Stomach: atrophic antritis - Bx for path and staining, spickled erythema of the body- Bx for path and staining, Pyloritek for H.p. Already (+) for H.pylori  Duodenum/jejunum: normal    Therapies:  See above    Specimens: see above           EBL: None    Complications:   None; patient tolerated the procedure well. Recommendations:  -Await pathology. , -Follow up with me., -No NSAIDS, -CT scan of the abdomen, H.pylori Rx to be sent into pharmacy    Felton Gtz MD

## 2017-10-16 NOTE — ANESTHESIA PREPROCEDURE EVALUATION
Anesthetic History               Review of Systems / Medical History  Patient summary reviewed, nursing notes reviewed and pertinent labs reviewed    Pulmonary                   Neuro/Psych         Psychiatric history     Cardiovascular    Hypertension  Valvular problems/murmurs: aortic stenosis    CHF: NYHA Classification III  Dysrhythmias   CAD    Exercise tolerance: >4 METS  Comments: Cardiomyopathy  EF 25%   GI/Hepatic/Renal     GERD          Comments: Weight loss  Ab pain Endo/Other        Arthritis     Other Findings                 Anesthetic Plan    ASA: 3  Anesthesia type: MAC          Induction: Intravenous  Anesthetic plan and risks discussed with: Patient

## 2017-10-16 NOTE — IP AVS SNAPSHOT
[address] 
[phone] Patient: Adria Manuel Sr. MRN: [id number] USQ:23/03/[birth year]24 You are allergic to the following Allergen Reactions Iodine Nausea and Vomiting Recent Documentation Height Weight BMI Smoking Status 1.702 m 60.8 kg 20.99 kg/m2 Never Smoker Emergency Contacts Name Discharge Info Relation Home Work Mobile Neeta Foreman(Sister)  Other Relative [6] [phone] [address] CAREGIVER [3] Daughter [21]   [phone] Claudia Yousif CAREGIVER [3] Friend [5]   [phone] About your hospitalization You were admitted on:  October 16, 2017 You last received care in the:  [facility] ENDOSCOPY You were discharged on:  October 16, 2017 Unit phone number:  [phone] Why you were hospitalized Your primary diagnosis was:  Not on File Providers Seen During Your Hospitalizations Provider Role Specialty Primary office phone Jaqui Watson MD Attending Provider Gastroenterology [phone] Your Primary Care Physician (PCP) Primary Care Physician Office Phone Office Fax Fina Donohue [phone] Follow-up Information None Current Discharge Medication List  
  
CONTINUE these medications which have NOT CHANGED Dose & Instructions Dispensing Information Comments Morning Noon Evening Bedtime  
 aspirin delayed-release 81 mg tablet Your last dose was: Your next dose is:    
   
   
 Dose:  81 mg Take 1 Tab by mouth daily. Quantity:  30 Tab Refills:  11  
     
   
   
   
  
 donepezil 10 mg tablet Commonly known as:  ARICEPT Your last dose was: Your next dose is: TAKE 1 TABLET BY MOUTH EVERY NIGHT Quantity:  90 Tab Refills:  3  
 **Patient requests 90 days supply**  
    
   
   
   
  
 finasteride 5 mg tablet Commonly known as:  PROSCAR Your last dose was: Your next dose is:    
   
   
 Dose:  5 mg Take 1 Tab by mouth daily. Quantity:  30 Tab Refills:  5  
     
   
   
   
  
 fluticasone 50 mcg/actuation nasal spray Commonly known as:  Court Regabril Your last dose was: Your next dose is:    
   
   
 Dose:  2 Spray 2 Sprays by Both Nostrils route daily. Quantity:  1 Bottle Refills:  2  
     
   
   
   
  
 losartan 50 mg tablet Commonly known as:  COZAAR Your last dose was: Your next dose is:    
   
   
 Dose:  100 mg Take 2 Tabs by mouth daily. Quantity:  60 Tab Refills:  6  
     
   
   
   
  
 penicillin v potassium 500 mg tablet Commonly known as:  VEETID Your last dose was: Your next dose is:    
   
   
 Dose:  500 mg Take 1 Tab by mouth two (2) times a day. Quantity:  14 Tab Refills:  0  
     
   
   
   
  
 sertraline 25 mg tablet Commonly known as:  ZOLOFT Your last dose was: Your next dose is:    
   
   
 Dose:  25 mg Take 1 Tab by mouth daily. Quantity:  90 Tab Refills:  1  
     
   
   
   
  
 tamsulosin 0.4 mg capsule Commonly known as:  FLOMAX Your last dose was: Your next dose is:    
   
   
 TK 1 C PO QHS Refills:  3 Discharge Instructions [address] 
[id number] 
1935 EGD DISCHARGE INSTRUCTIONS Discomfort: 
Sore throat- throat lozenges or warm salt water gargle 
redness at IV site- apply warm compress to area; if redness or soreness persist- contact your physician Gaseous discomfort- walking, belching will help relieve any discomfort You may not operate a vehicle for 12 hours You may not engage in an occupation involving machinery or appliances for rest of today. You may not drink alcoholic beverages for at least 12 hours Avoid making any critical decisions for at least 24 hour DIET You may resume your regular diet  however -  remember your colon is empty and a heavy meal will produce gas. Avoid these foods:  vegetables, fried / greasy foods, carbonated drinks ACTIVITY You may resume your normal daily activities. Spend the remainder of the day resting -  avoid any strenuous activity. CALL M.D. ANY SIGN OF Increasing pain, nausea, vomiting Abdominal distension (swelling) New increased bleeding (oral or rectal) Fever (chills) Pain in chest area Bloody discharge from nose or mouth Shortness of breath You may not take any Advil, Aspirin, Ibuprofen, Motrin, Aleve, or Goodys unless MD recommended, ONLY  Tylenol as needed for pain. Follow-up Instructions: 
 Call Dr. Holli Dobbs office to make appointment Office telephone for problems or questions [phone] Results of procedure / biopsy in 10-14 days Discharge Orders None Introducing [facility]! Chan Romero introduces [employer] patient portal. Now you can access parts of your medical record, email your doctor's office, and request medication refills online. 1. In your internet browser, go to [email or web address] 2. Click on the First Time User? Click Here link in the Sign In box. You will see the New Member Sign Up page. 3. Enter your [employer] Access Code exactly as it appears below. You will not need to use this code after youve completed the sign-up process. If you do not sign up before the expiration date, you must request a new code. · [employer] Access Code: [email or web address] Expires: 12/20/2017  2:44 PM 
 
4. Enter the last four digits of your Social Security Number (xxxx) and Date of Birth (mm/dd/yyyy) as indicated and click Submit. You will be taken to the next sign-up page. 5. Create a [employer] ID.  This will be your [employer] login ID and cannot be changed, so think of one that is secure and easy to remember. 6. Create a [employer] password. You can change your password at any time. 7. Enter your Password Reset Question and Answer. This can be used at a later time if you forget your password. 8. Enter your e-mail address. You will receive e-mail notification when new information is available in [address] 9. Click Sign Up. You can now view and download portions of your medical record. 10. Click the Download Summary menu link to download a portable copy of your medical information. If you have questions, please visit the Frequently Asked Questions section of the [employer] website. Remember, [employer] is NOT to be used for urgent needs. For medical emergencies, dial 911. Now available from your iPhone and Android! General Information Please provide this summary of care documentation to your next provider. Patient Signature:  ____________________________________________________________ Date:  ____________________________________________________________  
  
Sophia Andre Provider Signature:  ____________________________________________________________ Date:  ____________________________________________________________

## 2017-10-16 NOTE — DISCHARGE INSTRUCTIONS
Opal B[address]  [id number]  1935    EGD DISCHARGE INSTRUCTIONS  Discomfort:  Sore throat- throat lozenges or warm salt water gargle  redness at IV site- apply warm compress to area; if redness or soreness persist- contact your physician  Gaseous discomfort- walking, belching will help relieve any discomfort  You may not operate a vehicle for 12 hours  You may not engage in an occupation involving machinery or appliances for rest of today. You may not drink alcoholic beverages for at least 12 hours  Avoid making any critical decisions for at least 24 hour  DIET  You may resume your regular diet - however -  remember your colon is empty and a heavy meal will produce gas. Avoid these foods:  vegetables, fried / greasy foods, carbonated drinks  ACTIVITY  You may resume your normal daily activities. Spend the remainder of the day resting -  avoid any strenuous activity. CALL M.D. ANY SIGN OF   Increasing pain, nausea, vomiting  Abdominal distension (swelling)  New increased bleeding (oral or rectal)  Fever (chills)  Pain in chest area  Bloody discharge from nose or mouth  Shortness of breath    You may not take any Advil, Aspirin, Ibuprofen, Motrin, Aleve, or Goodys unless MD recommended, ONLY  Tylenol as needed for pain.     Follow-up Instructions:   Call Dr. Holli Dobbs office to make appointment  Office telephone for problems or questions [phone]  Results of procedure / biopsy in 10-14 days

## 2017-10-19 LAB
GLIADIN PEPTIDE IGA SER-ACNC: 4 UNITS (ref 0–19)
GLIADIN PEPTIDE IGG SER-ACNC: 1 UNITS (ref 0–19)
IGA SERPL-MCNC: 235 MG/DL (ref 61–437)
TTG IGA SER-ACNC: <2 U/ML (ref 0–3)
TTG IGG SER-ACNC: <2 U/ML (ref 0–5)

## 2017-11-14 ENCOUNTER — PATIENT OUTREACH (OUTPATIENT)
Dept: INTERNAL MEDICINE CLINIC | Age: 82
End: 2017-11-14

## 2017-11-14 NOTE — PROGRESS NOTES
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 Writer printed files from ED and neuro consult and gave to nurse for PCP to review. Pt apparently found not responsive with facial droop. Taken to [facility]. 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 Required emergent surgery for treatment. Currently in neurosurgery ICU post op. Has been evaluated for [facility] inpatient rehab. Will continue to follow and updated PCP team as needed.
